# Patient Record
Sex: FEMALE | Race: WHITE | NOT HISPANIC OR LATINO | Employment: FULL TIME | URBAN - METROPOLITAN AREA
[De-identification: names, ages, dates, MRNs, and addresses within clinical notes are randomized per-mention and may not be internally consistent; named-entity substitution may affect disease eponyms.]

---

## 2017-06-05 ENCOUNTER — TELEPHONE (OUTPATIENT)
Dept: OBSTETRICS AND GYNECOLOGY | Facility: CLINIC | Age: 22
End: 2017-06-05

## 2017-06-05 DIAGNOSIS — R10.2 PELVIC PAIN IN FEMALE: Primary | ICD-10-CM

## 2017-06-05 NOTE — TELEPHONE ENCOUNTER
Pt states she has left sided pelvic pain.  C/o cramping for 3 weeks.  Has a history of an ovarian torsion.  She is requesting an appt.  Scheduled with US and Angelina tomorrow at 1500. Aware of US prep.  She did not want to wait until Thursday to see Dr. Plata.

## 2017-06-06 ENCOUNTER — OFFICE VISIT (OUTPATIENT)
Dept: OBSTETRICS AND GYNECOLOGY | Facility: CLINIC | Age: 22
End: 2017-06-06
Payer: COMMERCIAL

## 2017-06-06 VITALS
WEIGHT: 150.13 LBS | DIASTOLIC BLOOD PRESSURE: 74 MMHG | SYSTOLIC BLOOD PRESSURE: 106 MMHG | BODY MASS INDEX: 27.63 KG/M2 | HEIGHT: 62 IN

## 2017-06-06 DIAGNOSIS — R10.32 LLQ ABDOMINAL PAIN: Primary | ICD-10-CM

## 2017-06-06 LAB
CANDIDA RRNA VAG QL PROBE: NEGATIVE
G VAGINALIS RRNA GENITAL QL PROBE: POSITIVE
T VAGINALIS RRNA GENITAL QL PROBE: NEGATIVE

## 2017-06-06 PROCEDURE — 99999 PR PBB SHADOW E&M-EST. PATIENT-LVL III: CPT | Mod: PBBFAC,,, | Performed by: NURSE PRACTITIONER

## 2017-06-06 PROCEDURE — 87480 CANDIDA DNA DIR PROBE: CPT

## 2017-06-06 PROCEDURE — 99213 OFFICE O/P EST LOW 20 MIN: CPT | Mod: S$GLB,,, | Performed by: NURSE PRACTITIONER

## 2017-06-06 NOTE — PROGRESS NOTES
Chief Complaint: Pelvic Pain     HPI:      (Swing pt)    Makenna Henriquez is a 21 y.o.  here for evaluation of pelvic pain. The pain is described as aching and sharp, and is 6-8/10 in intensity when it was most intense. Pain is located in the LLQ area without radiation. Onset was sudden occurring 1 month ago. Symptoms have been gradually improving since. Aggravating factors: none. Alleviating factors: none. Associated symptoms: none. The patient denies chills, constipation, diarrhea, dysuria, fever, flatus, frequency, nausea and vomiting. Risk factors for pelvic/abdominal pain include prior pelvic surgery and previous right ovarian torsion x 2; right ovarian teratoma surgically removed 2016. Patient denies change in appetite, nausea/vomiting and diarrhea history of abuse. Patient's last menstrual period was 2017 (exact date).  States she has not been sexually active since 2017.  Also states she has not taken her OCP's for over a year, as she just never went and had them filled.  States menstrual cycles are normal/monthly.    Past Medical History:   Diagnosis Date    Anxiety     Femoral hernia of left side     Insomnia     Ovarian torsion      Past Surgical History:   Procedure Laterality Date    ADENOIDECTOMY      PELVIC LAPAROSCOPY  2016    Right Ovarian Teratoma  ([facility])    WISDOM TOOTH EXTRACTION       Social History     Social History    Marital status: Single     Spouse name: N/A    Number of children: N/A    Years of education: N/A     Social History Main Topics    Smoking status: Never Smoker    Smokeless tobacco: Never Used    Alcohol use Yes      Comment: Socially    Drug use: No    Sexual activity: Yes     Partners: Male     Birth control/ protection: Condom, Coitus interruptus      Comment: Single     Other Topics Concern    None     Social History Narrative    None       ROS:     GENERAL: Denies weight gain or weight loss. Feeling well overall.   SKIN: Denies  "rash or lesions.   ABDOMEN: Reports LLQ abdominal pain; denies constipation, diarrhea, nausea, vomiting, change in appetite or rectal bleeding.   URINARY: Denies frequency, dysuria, hematuria.  PELVIC:  Denies vaginal discharge, irritation, or odor.  GYN: See HPI.    Physical Exam:      PHYSICAL EXAM:   Vitals:    06/06/17 1529   BP: 106/74   Weight: 68.1 kg (150 lb 2.1 oz)   Height: 5' 2" (1.575 m)   PainSc: 0-No pain     Body mass index is 27.46 kg/m².    General: No acute distress, alert and engaged  Abdomen: Soft, non-tender, non-distended, suprapubic tenderness absent.  No rebound tenderness or guarding noted.  NO ACUTE abdomen noted.  Pelvis: External genitalia and urethra within normal limits. Vagina without lesions, with dark red discharge (c/w menses that started yesterday), without erythema, without ulcers.  Cervix without cervical motion tenderness, non-friable. Uterus normal in size and nontender. No adnexal masses or tenderness palpated.    Assessment/Plan:     LLQ abdominal pain  -     Vaginosis Screen by DNA Probe        Will call patient with vaginal swab results.    Counseling:     * Acute abdominal pain prec given.  Reassurance given that no structural abnormalities noted upon today's u/s.  May take Ibuprofen prn.  Continue to monitor pain and associated symptoms.  "

## 2017-06-07 ENCOUNTER — TELEPHONE (OUTPATIENT)
Dept: OBSTETRICS AND GYNECOLOGY | Facility: CLINIC | Age: 22
End: 2017-06-07

## 2017-06-07 RX ORDER — TINIDAZOLE 500 MG/1
TABLET ORAL
Qty: 8 TABLET | Refills: 0 | Status: SHIPPED | OUTPATIENT
Start: 2017-06-07 | End: 2017-07-24

## 2017-06-07 NOTE — TELEPHONE ENCOUNTER
----- Message from Angelina Cook NP sent at 6/7/2017  1:19 PM CDT -----  Patient's AFFIRM swab was (+) for BV.    Please call her and let her know Rx sent for (Tinidazole x 2 days).  Remind patient she cannot drink while taking medication or for 3 days after completion of medication.      # Instruct patient to contact pharmacy to check on status of RX)

## 2017-06-07 NOTE — TELEPHONE ENCOUNTER
Spoke with pt and informed her per Angelina Cook NP; affirm swab positive for BV. Pt was informed that (Tinidazole x 2 days)  was sent to her pharmacy for treatment and was instructed on how to take medication. Pt expressed verbal understanding. KAILA

## 2017-06-07 NOTE — PROGRESS NOTES
Patient's AFFIRM swab was (+) for BV.    Please call her and let her know Rx sent for (Tinidazole x 2 days).  Remind patient she cannot drink while taking medication or for 3 days after completion of medication.    * Instruct patient to contact pharmacy to check on status of RX)

## 2017-07-24 ENCOUNTER — OFFICE VISIT (OUTPATIENT)
Dept: INTERNAL MEDICINE | Facility: CLINIC | Age: 22
End: 2017-07-24
Payer: COMMERCIAL

## 2017-07-24 VITALS
DIASTOLIC BLOOD PRESSURE: 78 MMHG | HEIGHT: 62 IN | BODY MASS INDEX: 27.1 KG/M2 | SYSTOLIC BLOOD PRESSURE: 120 MMHG | HEART RATE: 67 BPM | OXYGEN SATURATION: 96 % | WEIGHT: 147.25 LBS | TEMPERATURE: 98 F

## 2017-07-24 DIAGNOSIS — J06.9 UPPER RESPIRATORY TRACT INFECTION, UNSPECIFIED TYPE: Primary | ICD-10-CM

## 2017-07-24 PROCEDURE — 99999 PR PBB SHADOW E&M-EST. PATIENT-LVL III: CPT | Mod: PBBFAC,,, | Performed by: FAMILY MEDICINE

## 2017-07-24 PROCEDURE — 99213 OFFICE O/P EST LOW 20 MIN: CPT | Mod: S$GLB,,, | Performed by: FAMILY MEDICINE

## 2017-07-24 RX ORDER — BENZONATATE 100 MG/1
100 CAPSULE ORAL 3 TIMES DAILY PRN
Qty: 30 CAPSULE | Refills: 0 | Status: SHIPPED | OUTPATIENT
Start: 2017-07-24 | End: 2017-08-03

## 2017-07-24 RX ORDER — CODEINE PHOSPHATE AND GUAIFENESIN 10; 100 MG/5ML; MG/5ML
5 SOLUTION ORAL NIGHTLY PRN
Qty: 118 ML | Refills: 0 | Status: SHIPPED | OUTPATIENT
Start: 2017-07-24 | End: 2017-08-03

## 2017-07-24 RX ORDER — OXYMETAZOLINE HCL 0.05 %
2 SPRAY, NON-AEROSOL (ML) NASAL 2 TIMES DAILY
Refills: 0 | COMMUNITY
Start: 2017-07-24 | End: 2017-07-27

## 2017-07-24 RX ORDER — ALBUTEROL SULFATE 90 UG/1
2 AEROSOL, METERED RESPIRATORY (INHALATION) EVERY 6 HOURS PRN
Qty: 18 G | Refills: 0 | Status: SHIPPED | OUTPATIENT
Start: 2017-07-24 | End: 2023-10-03

## 2017-07-24 NOTE — PROGRESS NOTES
"Subjective:      Patient ID: Makenna Henriquez is a 22 y.o. female.    Chief Complaint: Sinusitis (x 4-5 days) and Cough    4-5 days noticed post nasal drip, fatigue, elevated temp 100.3 and last occurred about 3 days ago. She now remains congested, throat irritation, dry cough and worse with laying down. She has taking emergen-c for it and tylenol which is helping. She does have friends with a cold she has been exposed to.       Review of Systems   Constitutional: Negative.    HENT: Positive for congestion.    Eyes: Negative.    Respiratory: Positive for cough.    Cardiovascular: Negative for chest pain, palpitations and leg swelling.   Gastrointestinal: Negative.      I personally reviewed Past Medical History, Past Surgical history,  Past Social History and Family History    Objective:   /78 (BP Location: Right arm, Patient Position: Sitting, BP Method: Manual)   Pulse 67   Temp 97.8 °F (36.6 °C) (Oral)   Ht 5' 2" (1.575 m)   Wt 66.8 kg (147 lb 4.3 oz)   SpO2 96%   BMI 26.94 kg/m²     Physical Exam   Constitutional: She is oriented to person, place, and time. She appears well-developed and well-nourished. No distress.   HENT:   Head: Normocephalic and atraumatic.   Right Ear: Hearing, tympanic membrane, external ear and ear canal normal.   Left Ear: Hearing, tympanic membrane, external ear and ear canal normal.   Nose: Mucosal edema present. Right sinus exhibits no maxillary sinus tenderness and no frontal sinus tenderness. Left sinus exhibits no maxillary sinus tenderness and no frontal sinus tenderness.   Mouth/Throat: Uvula is midline and oropharynx is clear and moist.   Eyes: Conjunctivae and EOM are normal. Pupils are equal, round, and reactive to light. Right eye exhibits no discharge. Left eye exhibits no discharge. No scleral icterus.   Neck: Normal range of motion. No tracheal deviation present. No thyromegaly present.   Cardiovascular: Normal rate, regular rhythm, normal heart sounds and intact " distal pulses.  Exam reveals no gallop.    No murmur heard.  Pulmonary/Chest: Effort normal and breath sounds normal. No respiratory distress. She has no wheezes. She has no rales. She exhibits no tenderness.   Abdominal: Soft. Bowel sounds are normal. She exhibits no distension and no mass. There is no tenderness. There is no rebound and no guarding.   Musculoskeletal: Normal range of motion.   Lymphadenopathy:        Head (right side): Submandibular adenopathy present.        Head (left side): Submandibular adenopathy present.   Neurological: She is alert and oriented to person, place, and time.   Skin: Skin is warm and dry.   Psychiatric: She has a normal mood and affect. Her behavior is normal. Judgment and thought content normal.   Vitals reviewed.      Makenna was seen today for sinusitis and cough.    Diagnoses and all orders for this visit:    Upper respiratory tract infection, unspecified type  -patient to call if no improvement in 3 days consider abx  -will treat supportively     Other orders  -     benzonatate (TESSALON) 100 MG capsule; Take 1 capsule (100 mg total) by mouth 3 (three) times daily as needed for Cough.  -     albuterol 90 mcg/actuation inhaler; Inhale 2 puffs into the lungs every 6 (six) hours as needed for Wheezing or Shortness of Breath. Rescue  -     guaifenesin-codeine 100-10 mg/5 ml (TUSSI-ORGANIDIN NR)  mg/5 mL syrup; Take 5 mLs by mouth nightly as needed for Cough or Congestion.  -     oxymetazoline (AFRIN SINUS, OXYMETAZOLINE,) 0.05 % nasal spray; 2 sprays by Nasal route 2 (two) times daily.

## 2017-07-25 ENCOUNTER — NURSE TRIAGE (OUTPATIENT)
Dept: ADMINISTRATIVE | Facility: CLINIC | Age: 22
End: 2017-07-25

## 2017-07-26 NOTE — TELEPHONE ENCOUNTER
"  Reason for Disposition   MILD or MODERATE vomiting (e.g., 1 - 5 times / day) (all triage questions negative)    Answer Assessment - Initial Assessment Questions  1. VOMITING SEVERITY: "How many times have you vomited in the past 24 hours?"      - MILD:  1 - 2 times/day     - MODERATE: 3 - 5 times/day, decreased oral intake without significant weight loss or symptoms of dehydration     - SEVERE: 6 or more times/day, vomits everything or nearly everything, with significant weight loss, symptoms of dehydration       x1  2. ONSET: "When did the vomiting begin?"       20 min prior to call  3. FLUIDS: "What fluids or food have you vomited up today?" "Have you been able to keep any fluids down?"      Drinking well all day  4. ABDOMINAL PAIN: "Are your having any abdominal pain?" If yes : "How bad is it and what does it feel like?" (e.g., crampy, dull, intermittent, constant)       denies  5. DIARRHEA: "Is there any diarrhea?" If so, ask: "How many times today?"       denies  6. CONTACTS: "Is there anyone else in the family with the same symptoms?"       denies  7. CAUSE: "What do you think is causing your vomiting?"      Not sure if r/t sinus infection  8. HYDRATION STATUS: "Any signs of dehydration?" (e.g., dry mouth [not only dry lips], too weak to stand) "When did you last urinate?"      denies  9. OTHER SYMPTOMS: "Do you have any other symptoms?" (e.g., fever, headache, vertigo, vomiting blood or coffee grounds)      Intermittent HA, denies rest  10. PREGNANCY: "Is there any chance you are pregnant?" "When was your last menstrual period?"        n/a    Protocols used: ST VOMITING-A-  pt states she was seen yesterday and dx w/ URI/ has a lot of sinus drainage down back of throat/ has been drinking well, urinating well/ this PM felt nauseated and vomited x 1/ denies fever/ denies abdominal pain/emesis was the soup and toast she had eaten    Interim care--call back for any changes or concerns  F/u with pcp    Keila " Annia RUTH

## 2017-07-27 ENCOUNTER — TELEPHONE (OUTPATIENT)
Dept: INTERNAL MEDICINE | Facility: CLINIC | Age: 22
End: 2017-07-27

## 2017-07-27 RX ORDER — AZITHROMYCIN 250 MG/1
TABLET, FILM COATED ORAL
Qty: 6 TABLET | Refills: 0 | Status: SHIPPED | OUTPATIENT
Start: 2017-07-27 | End: 2017-08-01

## 2017-07-27 NOTE — LETTER
July 31, 2017    Makenna Henriquez  [address] [address]             [facility]  [address] [place] [address]  Phone: [phone]  Fax: [phone] Dear Ms. Henriquez:    Makenna,    Good afternoon. We have been unable to reach you by phone or leave any voicemails. On 7/27/17 Dr. Hernandez sent in Azithromycin to your pharmacy to treat your respiratory infection. Please let us know when you receive this message and let us know if this medication worked for you or if you are still experiencing any symptoms.     Thanks,  NATALIE Raygoza         If you have any questions or concerns, please don't hesitate to call.    Sincerely,        Idalmis Loera LPN

## 2017-07-27 NOTE — TELEPHONE ENCOUNTER
----- Message from Leonora Sanon sent at 7/27/2017 11:52 AM CDT -----  Contact: self, [phone]  Patient states she was on 7/24 for a respiratory infection and was told to call back and request antibiotics if she did not feel better. Please advise.

## 2017-07-31 NOTE — TELEPHONE ENCOUNTER
pts phone went straight to voicemail. Voicemail was not set up. No other number provided for pt. Message sent through [employer]. Letter also created and sent to pt.

## 2017-12-14 ENCOUNTER — OFFICE VISIT (OUTPATIENT)
Dept: INTERNAL MEDICINE | Facility: CLINIC | Age: 22
End: 2017-12-14
Payer: COMMERCIAL

## 2017-12-14 VITALS
HEIGHT: 62 IN | HEART RATE: 73 BPM | OXYGEN SATURATION: 98 % | TEMPERATURE: 99 F | BODY MASS INDEX: 26.78 KG/M2 | WEIGHT: 145.5 LBS | DIASTOLIC BLOOD PRESSURE: 74 MMHG | SYSTOLIC BLOOD PRESSURE: 120 MMHG

## 2017-12-14 DIAGNOSIS — R10.9 ABDOMINAL PAIN, UNSPECIFIED ABDOMINAL LOCATION: Primary | ICD-10-CM

## 2017-12-14 LAB — B-HCG UR QL: NEGATIVE

## 2017-12-14 PROCEDURE — 99212 OFFICE O/P EST SF 10 MIN: CPT | Mod: S$GLB,,, | Performed by: INTERNAL MEDICINE

## 2017-12-14 PROCEDURE — 99999 PR PBB SHADOW E&M-EST. PATIENT-LVL III: CPT | Mod: PBBFAC,,, | Performed by: INTERNAL MEDICINE

## 2017-12-14 PROCEDURE — 81025 URINE PREGNANCY TEST: CPT

## 2017-12-14 RX ORDER — DICYCLOMINE HYDROCHLORIDE 10 MG/1
10 CAPSULE ORAL
Qty: 16 CAPSULE | Refills: 0 | Status: SHIPPED | OUTPATIENT
Start: 2017-12-14 | End: 2017-12-18

## 2017-12-14 NOTE — PATIENT INSTRUCTIONS
Dicyclomine (bentyl) tablets or capsules  What is this medicine?  DICYCLOMINE (dye AYAHE shawna phillips) is used to treat bowel problems including irritable bowel syndrome.  How should I use this medicine?  Take this medicine by mouth with a glass of water. Follow the directions on the prescription label. It is best to take this medicine on an empty stomach, 30 minutes to 1 hour before meals. Take your medicine at regular intervals. Do not take your medicine more often than directed.  Talk to your pediatrician regarding the use of this medicine in children. Special care may be needed. While this drug may be prescribed for children as young as 6 months of age for selected conditions, precautions do apply.    What should I watch for while using this medicine?  You may get drowsy, dizzy, or have blurred vision. Do not drive, use machinery, or do anything that needs mental alertness until you know how this medicine affects you. To reduce the risk of dizzy or fainting spells, do not sit or stand up quickly, especially if you are an older patient. Alcohol can make you more drowsy, avoid alcoholic drinks.  Stay out of bright light and wear sunglasses if this medicine makes your eyes more sensitive to light.  Avoid extreme heat (hot tubs, saunas). This medicine can cause you to sweat less than normal. Your body temperature could increase to dangerous levels, which may lead to heat stroke.  Antacids can stop this medicine from working. If you get an upset stomach and want to take an antacid, make sure there is an interval of at least 1 to 2 hours before or after you take this medicine.  Your mouth may get dry. Chewing sugarless gum or sucking hard candy, and drinking plenty of water may help. Contact your doctor if the problem does not go away or is severe.  NOTE:This sheet is a summary. It may not cover all possible information. If you have questions about this medicine, talk to your doctor, pharmacist, or health care  provider. Copyright© 2017 [employer]        Diet for Vomiting or Diarrhea (Adult)    Your symptoms may return or get worse after eating certain foods listed below. If this happens, stop eating these foods until your symptoms ease and you feel better.  Once the vomiting stops, follow the steps below.   During the first 12 to 24 hours  During the first 12 to 24 hours, follow this diet:  · Drinks. Plain water, sport drinks like electrolyte solutions, soft drinks without caffeine, mineral water (plain or flavored), clear fruit juices, and decaffeinated tea and coffee.  · Soups. Clear broth.  · Desserts. Plain gelatin, popsicles, and fruit juice bars. As you feel better, you may add 6 to 8 ounces of yogurt per day. If you have diarrhea, don't have foods or drinks that contain sugar, high-fructose corn syrup, or sugar alcohols.  During the next 24 hours  During the next 24 hours you may add the following to the above:  · Hot cereal, plain toast, bread, rolls, and crackers  · Plain noodles, rice, mashed potatoes, and chicken noodle or rice soup  · Unsweetened canned fruit (but not pineapple) and bananas  Don't eat more than 15 grams of fat a day. Do this by staying away from margarine, butter, oils, mayonnaise, sauces, gravies, fried foods, peanut butter, meat, poultry, and fish.  Don't eat much fiber. Stay away from raw or cooked vegetables, fresh fruits (except bananas), and bran cereals.  Limit how much caffeine and chocolate you have. Do not use any spices or seasonings except salt.  During the next 24 hours  Slowly go back to your normal diet, as you feel better and your symptoms ease.  Date Last Reviewed: 8/1/2016  © 6328-4155 [employer]. [address] [place] [address], [place] [address]. All rights reserved. This information is not intended as a substitute for professional medical care. Always follow your healthcare professional's instructions.        Viral Gastroenteritis (Adult)    Gastroenteritis is  commonly called the stomach flu. It is most often caused by a virus that affects the stomach and intestinal tract and usually lasts from 2 to 7 days. Common viruses causing gastroenteritis include norovirus, rotavirus, and hepatitis A. Non-viral causes of gastroenteritis include bacteria, parasites, and toxins.  The danger from repeated vomiting or diarrhea is dehydration. This is the loss of too much fluid from the body. When this occurs, body fluids must be replaced. Antibiotics do not help with this illness because it is usually viral.Simple home treatment will be helpful.  Symptoms of viral gastroenteritis may include:  · Watery, loose stools  · Stomach pain or abdominal cramps  · Fever and chills  · Nausea and vomiting  · Loss of bowel control  · Headache  Home care  Gastroenteritis is transmitted by contact with the stool or vomit of an infected person. This can occur from person to person or from contact with a contaminated surface.  Follow these guidelines when caring for yourself at home:  · If symptoms are severe, rest at home for the next 24 hours or until you are feeling better.  · Wash your hands with soap and water or use alcohol-based  to prevent the spread of infection. Wash your hands after touching anyone who is sick.  · Wash your hands or use alcohol-based  after using the toilet and before meals. Clean the toilet after each use.  Remember these tips when preparing food:  · People with diarrhea should not prepare or serve food to others. When preparing foods, wash your hands before and after.  · Wash your hands after using cutting boards, countertops, knives, or utensils that have been in contact with raw food.  · Keep uncooked meats away from cooked and ready-to-eat foods.  Medicine  You may use acetaminophen or NSAID medicines like ibuprofen or naproxen to control fever unless another medicine was given. If you have chronic liver or kidney disease, talk with your healthcare  provider before using these medicines. Also talk with your provider if you've had a stomach ulcer or gastrointestinal bleeding. Don't give aspirin to anyone under 18 years of age who is ill with a fever. It may cause severe liver damage. Don't use NSAIDS is you are already taking one for another condition (like arthritis) or are on aspirin (such as for heart disease or after a stroke).  If medicine for vomiting or diarrhea are prescribed, take these only as directed. Do not take over-the-counter medicines for vomiting or diarrhea unless instructed by your healthcare provider.  Diet  Follow these guidelines for food:  · Water and liquids are important so you don't get dehydrated. Drink a small amount at a time or suck on ice chips if you are vomiting.  · If you eat, avoid fatty, greasy, spicy, or fried foods.  · Don't eat dairy if you have diarrhea. This can make diarrhea worse.  · Avoid tobacco, alcohol, and caffeine which may worsen symptoms.  During the first 24 hours (the first full day), follow the diet below:  · Beverages. Sports drinks, soft drinks without caffeine, ginger ale, mineral water (plain or flavored), decaffeinated tea and coffee. If you are very dehydrated, sports drinks aren't a good choice. They have too much sugar and not enough electrolytes. In this case, commercially available products called oral rehydration solutions, are best.  · Soups. Eat clear broth, consommé, and bouillon.  · Desserts. Eat gelatin, popsicles, and fruit juice bars.  During the next 24 hours (the second day), you may add the following to the above:  · Hot cereal, plain toast, bread, rolls, and crackers  · Plain noodles, rice, mashed potatoes, chicken noodle or rice soup  · Unsweetened canned fruit (avoid pineapple), bananas  · Limit fat intake to less than 15 grams per day. Do this by avoiding margarine, butter, oils, mayonnaise, sauces, gravies, fried foods, peanut butter, meat, poultry, and fish.  · Limit fiber and  avoid raw or cooked vegetables, fresh fruits (except bananas), and bran cereals.  · Limit caffeine and chocolate. Don't use spices or seasonings other than salt.  · Limit dairy products.  · Avoid alcohol.  During the next 24 hours:  · Gradually resume a normal diet as you feel better and your symptoms improve.  · If at any time it starts getting worse again, go back to clear liquids until you feel better.  Follow-up care  Follow up with your healthcare provider, or as advised. Call your provider if you don't get better within 24 hours or if diarrhea lasts more than a week. Also follow up if you are unable to keep down liquids and get dehydrated. If a stool (diarrhea) sample was taken, call as directed for the results.  Call 911  Call 911 if any of these occur:  · Trouble breathing  · Chest pain  · Confused  · Severe drowsiness or trouble awakening  · Fainting or loss of consciousness  · Rapid heart rate  · Seizure  · Stiff neck  When to seek medical advice  Call your healthcare provider right away if any of these occur:  · Abdominal pain that gets worse  · Continued vomiting (unable to keep liquids down)  · Frequent diarrhea (more than 5 times a day)  · Blood in vomit or stool (black or red color)  · Dark urine, reduced urine output, or extreme thirst  · Weakness or dizziness  · Drowsiness  · Fever of 100.4°F (38°C) oral or higher that does not get better with fever medicine  · New rash  Date Last Reviewed: 1/3/2016  © 7859-8223 [employer]. [address] [place] [address], [place] [address]. All rights reserved. This information is not intended as a substitute for professional medical care. Always follow your healthcare professional's instructions.

## 2017-12-16 NOTE — PROGRESS NOTES
Portions of this note are generated with voice recognition software. Typographical errors may exist.     SUBJECTIVE:    This is a/an 22 y.o. female here for primary care visit for  Chief Complaint   Patient presents with    Abdominal Pain     left side and radiates    Emesis    Diarrhea     States that symptoms started on Monday with nausea and then progressing to diarrhea.  No constitutional symptoms.  No significant abdominal pain just cramping but not of a severe nature.  Not seeming to localize in a specific region.  No known sick contacts.  No known occupational exposure.  Patient does not work with children.  Does not work in food industry.  Patient does not describe any risk factors for pregnancy.  No abnormal vaginal discharge.  Nausea has subsided significantly since the onset of this illness.  Now limited to diarrheal illness.  Frequency of diarrhea is 3-4 times in 24 hours.  Denies orthostasis or other symptoms of dehydration.  Patient has stayed home from work.  Patient has not pursued anti-diarrheal medication or antibiotics.  No recent anti-biotic utilization.      Medications Reviewed and Updated    Past medical, family, and social histories were reviewed and updated.    Review of Systems negative unless otherwise noted in history of present illness-  ROS    General ROS: negative  Psychological ROS: negative  Allergy and Immunology ROS: negative  Cardiovascular ROS: negative  Gastrointestinal ROS: negative  Genito-Urinary ROS: negative  Musculoskeletal ROS: negative          Allergic:    Review of patient's allergies indicates:   Allergen Reactions    Bactrim [sulfamethoxazole-trimethoprim]        OBJECTIVE:  BP: 120/74 Pulse: 73 Temp: 98.6 °F (37 °C)  Wt Readings from Last 3 Encounters:   12/14/17 66 kg (145 lb 8.1 oz)   07/24/17 66.8 kg (147 lb 4.3 oz)   06/06/17 68.1 kg (150 lb 2.1 oz)    Body mass index is 26.61 kg/m².  Previous Blood Pressure Readings :   BP Readings from Last 3 Encounters:    12/14/17 120/74   07/24/17 120/78   06/06/17 106/74       Physical Exam    GEN: No apparent distress  HEENT: sclera non-icteric, conjunctiva clear  CV: no peripheral edema  PULM: breathing non-labored  ABD: non, protuberant abdomen. Supple.  Hyperactive bowel sounds.  PSYCH: appropriate affect  MSK: able to rise from chair without assistance  SKIN: normal skin turgor    Pertinent Labs Reviewed       ASSESSMENT/PLAN:    Abdominal pain, unspecified abdominal location.  Likely viral gastroenteritis.  Symptomatic management. .patient advised if symptoms change or intensify to seek care in the nearest qualified health center   -     Cancel: POCT Urine Pregnancy  -     dicyclomine (BENTYL) 10 MG capsule; Take 1 capsule (10 mg total) by mouth 4 (four) times daily before meals and nightly.  Dispense: 16 capsule; Refill: 0  -     PREGNANCY TEST, URINE RAPID          No future appointments.    Yoan Bonner  12/16/2017  9:41 AM

## 2018-02-07 ENCOUNTER — OFFICE VISIT (OUTPATIENT)
Dept: FAMILY MEDICINE | Facility: CLINIC | Age: 23
End: 2018-02-07
Payer: COMMERCIAL

## 2018-02-07 VITALS
WEIGHT: 144.63 LBS | HEIGHT: 62 IN | OXYGEN SATURATION: 98 % | DIASTOLIC BLOOD PRESSURE: 80 MMHG | SYSTOLIC BLOOD PRESSURE: 120 MMHG | HEART RATE: 72 BPM | BODY MASS INDEX: 26.61 KG/M2 | TEMPERATURE: 99 F

## 2018-02-07 DIAGNOSIS — M54.9 MUSCULOSKELETAL BACK PAIN: Primary | ICD-10-CM

## 2018-02-07 PROCEDURE — 99999 PR PBB SHADOW E&M-EST. PATIENT-LVL III: CPT | Mod: PBBFAC,,, | Performed by: FAMILY MEDICINE

## 2018-02-07 PROCEDURE — 3008F BODY MASS INDEX DOCD: CPT | Mod: S$GLB,,, | Performed by: FAMILY MEDICINE

## 2018-02-07 PROCEDURE — 99214 OFFICE O/P EST MOD 30 MIN: CPT | Mod: S$GLB,,, | Performed by: FAMILY MEDICINE

## 2018-02-07 RX ORDER — CYCLOBENZAPRINE HCL 5 MG
5 TABLET ORAL 3 TIMES DAILY PRN
Qty: 21 TABLET | Refills: 0 | Status: SHIPPED | OUTPATIENT
Start: 2018-02-07 | End: 2018-02-14

## 2018-02-07 RX ORDER — IBUPROFEN 600 MG/1
600 TABLET ORAL 3 TIMES DAILY
Qty: 60 TABLET | Refills: 1 | Status: SHIPPED | OUTPATIENT
Start: 2018-02-07 | End: 2018-07-20

## 2018-02-07 NOTE — PROGRESS NOTES
HPI:  Makenna Henriquez is a 22 y.o. year old female that  presents with complaint of right sided shoulder pain that started yesterday morning. She denies any trauma. She has tried using a heating pad , alieve , and ibuprofen with no relief. She states that it is her right side of her back in the center of her scapula. She has not been doing any motions that have been unusual.  She may have slept on it in a weird position.  Chief Complaint   Patient presents with    Shoulder Pain     right x 2 days   .     HPI      Past Medical History:   Diagnosis Date    Anxiety     Femoral hernia of left side     Insomnia     Ovarian torsion      Social History     Social History    Marital status: Single     Spouse name: N/A    Number of children: N/A    Years of education: N/A     Occupational History    opthalmology clinic scribe       Social History Main Topics    Smoking status: Never Smoker    Smokeless tobacco: Never Used    Alcohol use Yes      Comment: Socially    Drug use: No    Sexual activity: Yes     Partners: Male     Birth control/ protection: Condom, Coitus interruptus      Comment: Single     Other Topics Concern    Not on file     Social History Narrative    No narrative on file     Past Surgical History:   Procedure Laterality Date    ADENOIDECTOMY      PELVIC LAPAROSCOPY  08/04/2016    Right Ovarian Teratoma  ([facility])    WISDOM TOOTH EXTRACTION       Family History   Problem Relation Age of Onset    Diabetes Mother     Hypertension Mother     Diabetes Father     Hypertension Father     Lung cancer Maternal Grandmother     Breast cancer Neg Hx     Colon cancer Neg Hx     Ovarian cancer Neg Hx     Cancer Neg Hx            Review of Systems  General ROS: negative for chills, fever or weight loss  ENT ROS: negative for epistaxis, sore throat or rhinorrhea  Respiratory ROS: no cough, shortness of breath, or wheezing  Cardiovascular ROS: no chest pain or dyspnea on exertion  Extrem: right  "shoulder pain    Physical Exam:  /80 (BP Location: Left arm, Patient Position: Sitting, BP Method: Medium (Manual))   Pulse 72   Temp 99 °F (37.2 °C) (Oral)   Ht 5' 2" (1.575 m)   Wt 65.6 kg (144 lb 10 oz)   LMP 02/05/2018   SpO2 98%   BMI 26.45 kg/m²   General appearance: alert, cooperative, no distress  Constitutional:Oriented to person, place, and time.appears well-developed and well-nourished.  Lungs: clear to auscultation bilaterally, no dullness to percussion bilaterally  Heart: regular rate and rhythm without rub; no displacement of the PMI , S1&S2 present  Extrem: Right shoulder pan to palpation , nl ROM, strength 5/5  Physical Exam    LABS:    Complete Blood Count  Lab Results   Component Value Date    RBC 4.67 09/10/2015    HGB 13.3 09/10/2015    HCT 39.7 09/10/2015    MCV 85 09/10/2015    MCH 28.5 09/10/2015    MCHC 33.5 09/10/2015    RDW 13.0 09/10/2015     09/10/2015    MPV 10.0 09/10/2015    GRAN 6.0 09/10/2015    GRAN 71.7 09/10/2015    LYMPH 1.7 09/10/2015    LYMPH 20.1 09/10/2015    MONO 0.5 09/10/2015    MONO 5.9 09/10/2015    EOS 0.2 09/10/2015    BASO 0.03 09/10/2015    EOSINOPHIL 1.8 09/10/2015    BASOPHIL 0.4 09/10/2015    DIFFMETHOD Automated 09/10/2015       Comprehensive Metabolic Panel  Lab Results   Component Value Date    GLU 91 09/10/2015    BUN 11 09/10/2015    CREATININE 0.8 09/10/2015     09/10/2015    K 4.1 09/10/2015     09/10/2015    PROT 7.1 09/10/2015    ALBUMIN 3.6 09/10/2015    BILITOT 0.2 09/10/2015    AST 16 09/10/2015    ALKPHOS 57 09/10/2015    CO2 22 (L) 09/10/2015    ALT 11 09/10/2015    ANIONGAP 9 09/10/2015    EGFRNONAA >60.0 09/10/2015    ESTGFRAFRICA >60.0 09/10/2015       LIPID  Lab Results   Component Value Date    CHOL 155 09/10/2015    HDL 46 09/10/2015         TSH  Lab Results   Component Value Date    TSH 0.994 09/10/2015       Current Outpatient Prescriptions   Medication Sig Dispense Refill    albuterol 90 mcg/actuation inhaler " Inhale 2 puffs into the lungs every 6 (six) hours as needed for Wheezing or Shortness of Breath. Rescue 18 g 0    b complex vitamins capsule Take 1 capsule by mouth once daily.      cyclobenzaprine (FLEXERIL) 5 MG tablet Take 1 tablet (5 mg total) by mouth 3 (three) times daily as needed for Muscle spasms. 21 tablet 0    ibuprofen (ADVIL,MOTRIN) 600 MG tablet Take 1 tablet (600 mg total) by mouth 3 (three) times daily. 60 tablet 1     No current facility-administered medications for this visit.        Assessment:    ICD-10-CM ICD-9-CM    1. Musculoskeletal back pain M54.9 724.5 cyclobenzaprine (FLEXERIL) 5 MG tablet      ibuprofen (ADVIL,MOTRIN) 600 MG tablet         Plan:    No Follow-up on file.          Ada Banegas MD

## 2018-04-23 ENCOUNTER — TELEPHONE (OUTPATIENT)
Dept: INTERNAL MEDICINE | Facility: CLINIC | Age: 23
End: 2018-04-23

## 2018-04-23 NOTE — TELEPHONE ENCOUNTER
----- Message from Su Fitzpatrick sent at 4/23/2018 12:27 PM CDT -----  No. [phone]    Patient needs labs scheduled.  Her appointment for a physical is 5/2/18.

## 2018-04-24 ENCOUNTER — TELEPHONE (OUTPATIENT)
Dept: INTERNAL MEDICINE | Facility: CLINIC | Age: 23
End: 2018-04-24

## 2018-04-24 DIAGNOSIS — Z00.00 ROUTINE GENERAL MEDICAL EXAMINATION AT A HEALTH CARE FACILITY: Primary | ICD-10-CM

## 2018-04-24 NOTE — TELEPHONE ENCOUNTER
----- Message from Priyanka Alvarado sent at 4/23/2018  5:41 PM CDT -----  Contact: [phone]/self - she is available after 4pm  Patient states she is returning your call. Please advise.

## 2018-05-21 ENCOUNTER — TELEPHONE (OUTPATIENT)
Dept: OBSTETRICS AND GYNECOLOGY | Facility: CLINIC | Age: 23
End: 2018-05-21

## 2018-05-21 DIAGNOSIS — N83.209 CYST OF OVARY, UNSPECIFIED LATERALITY: Primary | ICD-10-CM

## 2018-05-21 NOTE — TELEPHONE ENCOUNTER
Dr. Plata-- pt states that she just got discharged from the ER and states that they wanted her to follow up with her OBGYN asap, because they think a cyst ruptured. Pt states that she has a history of ovarian cyst. Pt would like to be seen tomorrow if possible. Pt's # [phone]

## 2018-05-21 NOTE — TELEPHONE ENCOUNTER
Swing pt- states she went to  ED due to pain on her left side and they did and ultrasound and they told her she had a ruptured cyst and to follow up with her OBGYN. Pt states she has a history of ovarian torsion and would like to f/u. They did not see torsion on her ultrasound at the ED, but instructed her to f/u with her OBGYN in 6 weeks.     Pt scheduled June 28th for US+appt. Aware of US prep. Declined earlier appt stating if her symptoms worsened she would return call.

## 2018-07-12 ENCOUNTER — LAB VISIT (OUTPATIENT)
Dept: LAB | Facility: HOSPITAL | Age: 23
End: 2018-07-12
Attending: INTERNAL MEDICINE
Payer: COMMERCIAL

## 2018-07-12 DIAGNOSIS — Z00.00 ROUTINE GENERAL MEDICAL EXAMINATION AT A HEALTH CARE FACILITY: ICD-10-CM

## 2018-07-12 LAB
ALBUMIN SERPL BCP-MCNC: 3.6 G/DL
ALP SERPL-CCNC: 51 U/L
ALT SERPL W/O P-5'-P-CCNC: 6 U/L
ANION GAP SERPL CALC-SCNC: 7 MMOL/L
AST SERPL-CCNC: 13 U/L
BASOPHILS # BLD AUTO: 0.03 K/UL
BASOPHILS NFR BLD: 0.4 %
BILIRUB SERPL-MCNC: 0.4 MG/DL
BUN SERPL-MCNC: 8 MG/DL
CALCIUM SERPL-MCNC: 8.8 MG/DL
CHLORIDE SERPL-SCNC: 107 MMOL/L
CHOLEST SERPL-MCNC: 151 MG/DL
CHOLEST/HDLC SERPL: 3.1 {RATIO}
CO2 SERPL-SCNC: 26 MMOL/L
CREAT SERPL-MCNC: 0.7 MG/DL
DIFFERENTIAL METHOD: NORMAL
EOSINOPHIL # BLD AUTO: 0.4 K/UL
EOSINOPHIL NFR BLD: 4.8 %
ERYTHROCYTE [DISTWIDTH] IN BLOOD BY AUTOMATED COUNT: 13.2 %
EST. GFR  (AFRICAN AMERICAN): >60 ML/MIN/1.73 M^2
EST. GFR  (NON AFRICAN AMERICAN): >60 ML/MIN/1.73 M^2
GLUCOSE SERPL-MCNC: 100 MG/DL
HCT VFR BLD AUTO: 38.4 %
HDLC SERPL-MCNC: 48 MG/DL
HDLC SERPL: 31.8 %
HGB BLD-MCNC: 12.7 G/DL
IMM GRANULOCYTES # BLD AUTO: 0.03 K/UL
IMM GRANULOCYTES NFR BLD AUTO: 0.4 %
LDLC SERPL CALC-MCNC: 76.4 MG/DL
LYMPHOCYTES # BLD AUTO: 2 K/UL
LYMPHOCYTES NFR BLD: 27.3 %
MCH RBC QN AUTO: 29.2 PG
MCHC RBC AUTO-ENTMCNC: 33.1 G/DL
MCV RBC AUTO: 88 FL
MONOCYTES # BLD AUTO: 0.4 K/UL
MONOCYTES NFR BLD: 5.7 %
NEUTROPHILS # BLD AUTO: 4.5 K/UL
NEUTROPHILS NFR BLD: 61.4 %
NONHDLC SERPL-MCNC: 103 MG/DL
NRBC BLD-RTO: 0 /100 WBC
PLATELET # BLD AUTO: 343 K/UL
PMV BLD AUTO: 10.1 FL
POTASSIUM SERPL-SCNC: 3.8 MMOL/L
PROT SERPL-MCNC: 6.9 G/DL
RBC # BLD AUTO: 4.35 M/UL
SODIUM SERPL-SCNC: 140 MMOL/L
TRIGL SERPL-MCNC: 133 MG/DL
TSH SERPL DL<=0.005 MIU/L-ACNC: 0.65 UIU/ML
WBC # BLD AUTO: 7.35 K/UL

## 2018-07-12 PROCEDURE — 80053 COMPREHEN METABOLIC PANEL: CPT

## 2018-07-12 PROCEDURE — 36415 COLL VENOUS BLD VENIPUNCTURE: CPT | Mod: PO

## 2018-07-12 PROCEDURE — 84443 ASSAY THYROID STIM HORMONE: CPT

## 2018-07-12 PROCEDURE — 85025 COMPLETE CBC W/AUTO DIFF WBC: CPT

## 2018-07-12 PROCEDURE — 80061 LIPID PANEL: CPT

## 2018-07-20 ENCOUNTER — OFFICE VISIT (OUTPATIENT)
Dept: INTERNAL MEDICINE | Facility: CLINIC | Age: 23
End: 2018-07-20
Payer: COMMERCIAL

## 2018-07-20 VITALS
BODY MASS INDEX: 28.16 KG/M2 | WEIGHT: 153 LBS | HEART RATE: 70 BPM | DIASTOLIC BLOOD PRESSURE: 64 MMHG | OXYGEN SATURATION: 95 % | HEIGHT: 62 IN | SYSTOLIC BLOOD PRESSURE: 102 MMHG

## 2018-07-20 DIAGNOSIS — Z00.00 PREVENTATIVE HEALTH CARE: Primary | ICD-10-CM

## 2018-07-20 DIAGNOSIS — Z23 NEED FOR TDAP VACCINATION: ICD-10-CM

## 2018-07-20 DIAGNOSIS — Z23 NEED FOR HPV VACCINATION: ICD-10-CM

## 2018-07-20 DIAGNOSIS — F41.1 GAD (GENERALIZED ANXIETY DISORDER): ICD-10-CM

## 2018-07-20 PROBLEM — D27.0 TERATOMA OF OVARY, RIGHT: Status: ACTIVE | Noted: 2018-07-20

## 2018-07-20 PROBLEM — D27.0 TERATOMA OF OVARY, RIGHT: Status: RESOLVED | Noted: 2018-07-20 | Resolved: 2018-07-20

## 2018-07-20 PROBLEM — N83.519 OVARIAN TORSION: Status: ACTIVE | Noted: 2018-07-20

## 2018-07-20 PROBLEM — N83.519 OVARIAN TORSION: Status: RESOLVED | Noted: 2018-07-20 | Resolved: 2018-07-20

## 2018-07-20 PROCEDURE — 99999 PR PBB SHADOW E&M-EST. PATIENT-LVL III: CPT | Mod: PBBFAC,,, | Performed by: INTERNAL MEDICINE

## 2018-07-20 PROCEDURE — 90472 IMMUNIZATION ADMIN EACH ADD: CPT | Mod: S$GLB,,, | Performed by: INTERNAL MEDICINE

## 2018-07-20 PROCEDURE — 90471 IMMUNIZATION ADMIN: CPT | Mod: S$GLB,,, | Performed by: INTERNAL MEDICINE

## 2018-07-20 PROCEDURE — 90715 TDAP VACCINE 7 YRS/> IM: CPT | Mod: S$GLB,,, | Performed by: INTERNAL MEDICINE

## 2018-07-20 PROCEDURE — 90651 9VHPV VACCINE 2/3 DOSE IM: CPT | Mod: S$GLB,,, | Performed by: INTERNAL MEDICINE

## 2018-07-20 PROCEDURE — 99395 PREV VISIT EST AGE 18-39: CPT | Mod: 25,S$GLB,, | Performed by: INTERNAL MEDICINE

## 2018-07-20 RX ORDER — CITALOPRAM 20 MG/1
20 TABLET, FILM COATED ORAL DAILY
Qty: 30 TABLET | Refills: 5 | Status: SHIPPED | OUTPATIENT
Start: 2018-07-20 | End: 2018-09-06

## 2018-07-20 NOTE — PROGRESS NOTES
Subjective:       Patient ID: Makenna Henriquez is a 22 y.o. female.    Chief Complaint: Annual Exam    HPI 22-year-old female presents to clinic today for annual physical she is having some problems with anxiety it would like to resume a medication for this.  She was previously taking Celexa and like to restart it she states she was on a low-dose before would like to try the higher dose previously she was on 10 mg.  Typically she uses meditation and exercise to help with her anxiety but feels it is more of a consistent issue and would like to resume her medication for anxiety disorder.  Patient reported a little tenderness in the right axillary tail region she states is somewhat a little tender during her cycles and around that time  Review of Systems  otherwise negative  Objective:      Physical Exam  General: Well-appearing, well-nourished.  No distress  HEENT: conjunctivae are normal.  Pupils are equal and reative to light.  TM's are clear and intact bilaterally.  Hearing is grossly normal.  Nasopharynx is clear.  Oropharynx is clear.  Neck: Supple.  No thyroid megaly.  No bruits.  Lymph: No cervical or supraclavicular adenopathy.  Heart: Regular rate and rhythm, without murmur, rub or gallop.  Lungs: Clear to auscultation; respiratory effort normal.  Abdomen: Soft, nontender, nondistended.  Normoactive bowel sounds.  No hepatomegaly.  No masses.  Extremities: Good distal pulses.  No edema.  Psych: Oriented to time person place.  Judgment and insight seem unimpaired.  Mood and affect are appropriate.  Axillary and breast region examined on this affected side no palpable densities patient could not localize at this time nontender  Assessment:       1. Need for Tdap vaccination    2. Need for HPV vaccination    3. Preventative health care    4. MIKEY (generalized anxiety disorder)      five.  Some maxillary tenderness possible small reactive lymph node versus glandular etiology recommend follow-up if increased in size  or concerns or more problematic.  Given that it comes and goes less likely to be suspicious or ominous  Plan:       Makenna was seen today for annual exam.    Diagnoses and all orders for this visit:    Need for Tdap vaccination  -     (In Office Administered) Tdap Vaccine    Need for HPV vaccination  -     (In Office Administered) HPV Vaccine (9-Valent) (3 Dose) (IM)    Preventative health care  Updated performed reviewed today  MIKEY (generalized anxiety disorder)  -     citalopram (CELEXA) 20 MG tablet; Take 1 tablet (20 mg total) by mouth once daily.

## 2018-07-24 ENCOUNTER — PATIENT MESSAGE (OUTPATIENT)
Dept: INTERNAL MEDICINE | Facility: CLINIC | Age: 23
End: 2018-07-24

## 2018-07-27 ENCOUNTER — PATIENT MESSAGE (OUTPATIENT)
Dept: FAMILY MEDICINE | Facility: CLINIC | Age: 23
End: 2018-07-27

## 2018-08-24 ENCOUNTER — TELEPHONE (OUTPATIENT)
Dept: INTERNAL MEDICINE | Facility: CLINIC | Age: 23
End: 2018-08-24

## 2018-08-24 NOTE — TELEPHONE ENCOUNTER
----- Message from Latisha Singh sent at 8/24/2018 12:20 PM CDT -----  Contact: self / [phone]  Patient is requesting a call back . Please advise

## 2018-08-24 NOTE — TELEPHONE ENCOUNTER
Pt states she has taken 2 pregnacy test and it is positive and would like to know if she should continue taking celexa

## 2018-08-27 NOTE — TELEPHONE ENCOUNTER
Please have patient come in this morning so we can discuss issue and get her into OB soon.  Does she know who she wants to see or which hospital she wants to deliver at?

## 2018-09-05 ENCOUNTER — OFFICE VISIT (OUTPATIENT)
Dept: INTERNAL MEDICINE | Facility: CLINIC | Age: 23
End: 2018-09-05
Payer: COMMERCIAL

## 2018-09-05 ENCOUNTER — TELEPHONE (OUTPATIENT)
Dept: OBSTETRICS AND GYNECOLOGY | Facility: CLINIC | Age: 23
End: 2018-09-05

## 2018-09-05 ENCOUNTER — LAB VISIT (OUTPATIENT)
Dept: LAB | Facility: HOSPITAL | Age: 23
End: 2018-09-05
Attending: INTERNAL MEDICINE
Payer: COMMERCIAL

## 2018-09-05 VITALS
TEMPERATURE: 98 F | SYSTOLIC BLOOD PRESSURE: 110 MMHG | OXYGEN SATURATION: 97 % | HEIGHT: 62 IN | WEIGHT: 147.94 LBS | HEART RATE: 73 BPM | BODY MASS INDEX: 27.22 KG/M2 | DIASTOLIC BLOOD PRESSURE: 70 MMHG

## 2018-09-05 DIAGNOSIS — R11.2 NAUSEA AND VOMITING, INTRACTABILITY OF VOMITING NOT SPECIFIED, UNSPECIFIED VOMITING TYPE: ICD-10-CM

## 2018-09-05 DIAGNOSIS — O21.0 HYPEREMESIS GRAVIDARUM: ICD-10-CM

## 2018-09-05 DIAGNOSIS — R19.7 DIARRHEA, UNSPECIFIED TYPE: ICD-10-CM

## 2018-09-05 DIAGNOSIS — O21.0 HYPEREMESIS GRAVIDARUM: Primary | ICD-10-CM

## 2018-09-05 LAB
ALBUMIN SERPL BCP-MCNC: 3.6 G/DL
ALP SERPL-CCNC: 43 U/L
ALT SERPL W/O P-5'-P-CCNC: 9 U/L
ANION GAP SERPL CALC-SCNC: 10 MMOL/L
AST SERPL-CCNC: 13 U/L
BILIRUB SERPL-MCNC: 0.4 MG/DL
BUN SERPL-MCNC: 7 MG/DL
CALCIUM SERPL-MCNC: 9.1 MG/DL
CHLORIDE SERPL-SCNC: 104 MMOL/L
CO2 SERPL-SCNC: 22 MMOL/L
CREAT SERPL-MCNC: 0.6 MG/DL
EST. GFR  (AFRICAN AMERICAN): >60 ML/MIN/1.73 M^2
EST. GFR  (NON AFRICAN AMERICAN): >60 ML/MIN/1.73 M^2
GLUCOSE SERPL-MCNC: 80 MG/DL
HCG INTACT+B SERPL-ACNC: NORMAL MIU/ML
POTASSIUM SERPL-SCNC: 3.7 MMOL/L
PROT SERPL-MCNC: 7.2 G/DL
SODIUM SERPL-SCNC: 136 MMOL/L

## 2018-09-05 PROCEDURE — 99999 PR PBB SHADOW E&M-EST. PATIENT-LVL III: CPT | Mod: PBBFAC,,, | Performed by: INTERNAL MEDICINE

## 2018-09-05 PROCEDURE — 80053 COMPREHEN METABOLIC PANEL: CPT

## 2018-09-05 PROCEDURE — 3008F BODY MASS INDEX DOCD: CPT | Mod: CPTII,S$GLB,, | Performed by: INTERNAL MEDICINE

## 2018-09-05 PROCEDURE — 99214 OFFICE O/P EST MOD 30 MIN: CPT | Mod: S$GLB,,, | Performed by: INTERNAL MEDICINE

## 2018-09-05 PROCEDURE — 84702 CHORIONIC GONADOTROPIN TEST: CPT

## 2018-09-05 PROCEDURE — 36415 COLL VENOUS BLD VENIPUNCTURE: CPT | Mod: PO

## 2018-09-05 RX ORDER — DOXYLAMINE SUCCINATE AND PYRIDOXINE HYDROCHLORIDE, DELAYED RELEASE TABLETS 10 MG/10 MG 10; 10 MG/1; MG/1
2 TABLET, DELAYED RELEASE ORAL NIGHTLY
Qty: 120 TABLET | Refills: 1 | Status: SHIPPED | OUTPATIENT
Start: 2018-09-05 | End: 2018-09-13 | Stop reason: ALTCHOICE

## 2018-09-05 NOTE — TELEPHONE ENCOUNTER
Unconfirmed OB pt c/o nausea for the past month and diarrhea for the past 3 days.  She has intermittent vomiting.  Recommended Imodium if diarrhea is uncontrollable and increase PO hydration.  She has an appt with PCP today.      Diclegis pended

## 2018-09-05 NOTE — TELEPHONE ENCOUNTER
Swing pt, lmp first week of July. Pt has been having vomiting and has had diarrhea for three days. Pt has pregnancy confirmation appt scheduled for tomorrow but wants to come today.

## 2018-09-05 NOTE — PROGRESS NOTES
Subjective:       Patient ID: Makenna Henriquez is a 23 y.o. female.    Chief Complaint: Diarrhea (x3 days); Nausea (x 1 month); and Emesis (x 1 month)    HPI23-year-old female presents to clinic today she is approximately 10 weeks pregnant and about to see OBGYN for her 1st initial visit tomorrow.  She has been suffering with nausea associated with vomiting for at least the last 3-4 weeks.  Vomiting occurs at least twice a day she has no appetite she really has not had anything solid for the last 2 weeks eating a very bland diet and toast.  She contacted the Ob today and they sent in prescription for her diclegis.   She is going to pick it up after this visit.  She also reports having diarrhea for the last 3 days loose and watery.  Boyfriend had similar illness.  She tries to keep up with hydration drinking small amounts at a time.  She does report some abdominal cramping with the diarrhea that is new onset.  No fevers.  Review of Systems    Otherwise negative  Objective:      Physical Exam   General: Well-appearing, well-nourished.  No distress  HEENT: conjunctivae are normal.  Pupils are equal and reative to light.  TM's are clear and intact bilaterally.  Hearing is grossly normal.  Nasopharynx is clear.  Oropharynx is clear.  Neck: Supple.  No thyroid megaly.  No bruits.  Lymph: No cervical or supraclavicular adenopathy.  Heart: Regular rate and rhythm, without murmur, rub or gallop.  Lungs: Clear to auscultation; respiratory effort normal.  Abdomen: Soft, nontender, nondistended.  Normoactive bowel sounds.  No hepatomegaly.  No masses.  Extremities: Good distal pulses.  No edema.  Psych: Oriented to time person place.  Judgment and insight seem unimpaired.  Mood and affect are appropriate.  Assessment:       1. Hyperemesis gravidarum    2. Diarrhea, unspecified type    3. Nausea and vomiting, intractability of vomiting not specified, unspecified vomiting type        Plan:       Makenna was seen today for diarrhea,  nausea and emesis.    Diagnoses and all orders for this visit:    Hyperemesis gravidarum  -     hCG, quantitative; Future  -     Comprehensive metabolic panel; Future    Follow recommendations as prescribed by OB.  Keep appointment for tomorrow.  Recommended hydration with  Small volumes.  Diarrhea, unspecified type   suspect viral advise that this will likely be self-limiting over the next week.  Continue to maintain hydration.  Nausea and vomiting, intractability of vomiting not specified, unspecified vomiting type  -     hCG, quantitative; Future  -     Comprehensive metabolic panel; Future

## 2018-09-06 ENCOUNTER — OFFICE VISIT (OUTPATIENT)
Dept: OBSTETRICS AND GYNECOLOGY | Facility: CLINIC | Age: 23
End: 2018-09-06
Payer: COMMERCIAL

## 2018-09-06 VITALS
DIASTOLIC BLOOD PRESSURE: 72 MMHG | HEIGHT: 62 IN | SYSTOLIC BLOOD PRESSURE: 120 MMHG | BODY MASS INDEX: 26.74 KG/M2 | WEIGHT: 145.31 LBS

## 2018-09-06 DIAGNOSIS — N92.6 MISSED MENSES: Primary | ICD-10-CM

## 2018-09-06 DIAGNOSIS — O21.9 NAUSEA/VOMITING IN PREGNANCY: ICD-10-CM

## 2018-09-06 DIAGNOSIS — Z32.01 POSITIVE URINE PREGNANCY TEST: ICD-10-CM

## 2018-09-06 PROCEDURE — 99999 PR PBB SHADOW E&M-EST. PATIENT-LVL III: CPT | Mod: PBBFAC,,, | Performed by: NURSE PRACTITIONER

## 2018-09-06 PROCEDURE — 99214 OFFICE O/P EST MOD 30 MIN: CPT | Mod: S$GLB,,, | Performed by: NURSE PRACTITIONER

## 2018-09-06 PROCEDURE — 3008F BODY MASS INDEX DOCD: CPT | Mod: CPTII,S$GLB,, | Performed by: NURSE PRACTITIONER

## 2018-09-06 RX ORDER — ONDANSETRON 8 MG/1
8 TABLET, ORALLY DISINTEGRATING ORAL EVERY 8 HOURS PRN
Qty: 12 TABLET | Refills: 1 | Status: SHIPPED | OUTPATIENT
Start: 2018-09-06 | End: 2018-09-13 | Stop reason: SDUPTHER

## 2018-09-06 NOTE — PROGRESS NOTES
"CC: Absence of menses    (Dr. Plata patient)  Patient's last menstrual period was 2018 (exact date).,   EDC: 04/15/2019,   GA:  8w 3d      Makenna Henriquez is a 23 y.o. female  presents with complaint of absence of menstruation.   She reports moderate-severe nausea & vomiting x 4 weeks and is waiting on Bonjesta approval;  denies bleeding, or abdominal pain.  She and boyfriend do live together.  At the end of the Confirmation visit, the pt mentioned that she is unsure of what to do about pregnancy, and has not ruled out termination since this was an unplanned surprise.  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  I recommended that they not make any rash decisions in this matter, and let all of this absorb, giving herself some time to get her n/v under control, as well as meet with  and have U/S in 1 week.  They both verbalized understanding and agreement.    UPT is: positive.     Last Pap:  2016 Normal,  HPV n/a    Past Medical History:   Diagnosis Date    Anxiety     Femoral hernia of left side     Insomnia     Ovarian torsion      Past Surgical History:   Procedure Laterality Date    ADENOIDECTOMY      PELVIC LAPAROSCOPY  2016    Right Ovarian Teratoma  ([facility])    WISDOM TOOTH EXTRACTION       Social History     Tobacco Use    Smoking status: Never Smoker    Smokeless tobacco: Never Used   Substance Use Topics    Alcohol use: Yes     Comment: Socially    Drug use: No     Family History   Problem Relation Age of Onset    Diabetes Mother     Hypertension Mother     Diabetes Father     Hypertension Father     Lung cancer Maternal Grandmother     Breast cancer Neg Hx     Colon cancer Neg Hx     Ovarian cancer Neg Hx     Cancer Neg Hx      OB History    Para Term  AB Living   0 0 0 0 0 0   SAB TAB Ectopic Multiple Live Births   0 0 0 0               /72   Ht 5' 2" (1.575 m)   Wt 65.9 kg (145 lb 4.5 oz)   LMP 2018 " (Exact Date)   BMI 26.57 kg/m²     ROS:  GENERAL: Denies weight gain or weight loss. Feeling well overall.   SKIN: Denies rash or lesions.   HEAD: Denies head injury, headache, or vision changes.   NODES: Denies enlarged lymph nodes.  HEMATOLOGIC: No easy bruisability or excessive bleeding.   MUSCULOSKELETAL: Denies joint pain or swelling.    CARDIOVASCULAR: No chest pain. No shortness of breath. No leg cramps.  NEUROLOGICAL: No headaches. No vision changes.  PSYCHIATRIC: Denies depression, anxiety or mood swings.    VULVOVAGINAL: denies itching, denies abnormal bleeding and denies lesions.  ABDOMEN: denies abdominal pain. nausea and vomiting  Denies nausea. Denies vomiting. No diarrhea. No constipation  URINARY: No incontinence, no nocturia, no frequency and no dysuria.  BREASTS: The patient performs breast self-examination and denies pain, lumps, or nipple discharge.       PE:   APPEARANCE: Well nourished, well developed, in no acute distress.  AFFECT: WNL, alert and oriented x 3.  NECK: Neck symmetric without masses or thyromegaly.   CHEST: Good respiratory effort.     ASSESSMENT and PLAN:  Missed menses  -     ondansetron (ZOFRAN-ODT) 8 MG TbDL; Take 1 tablet (8 mg total) by mouth every 8 (eight) hours as needed (as needed for nausea/vomiting).  Dispense: 12 tablet; Refill: 1    Positive urine pregnancy test  -     US OB/GYN Procedure ([facility]) - Extended List - Future; Future    Nausea/vomiting in pregnancy  -     ondansetron (ZOFRAN-ODT) 8 MG TbDL; Take 1 tablet (8 mg total) by mouth every 8 (eight) hours as needed (as needed for nausea/vomiting).  Dispense: 12 tablet; Refill: 1    *  D/W patient taking Zofran for more emergent breakthrough n/v that is not controlled well enough by Bonjesta as directed, to avoid constipation.  Encouraged her to eat small bites of bland things, such as saltine crackers, and also encouraged her to try and get some Gatorade down if possible to help replenish her electrolytes;  also eating fruits that are high in water content may be helpful if she is unable to keep water down by itself.    *  New OB packet reviewed at length and copy given to patient.  Zika precautions given as well.  Patient was counseled today on proper weight gain based on the [place] [facility]'s recommendations based on her pre-pregnancy weight. Discussed foods to avoid in pregnancy (i.e. sushi, fish that are high in mercury, deli meat, and unpasteurized cheeses). Discussed prenatal vitamin options (i.e. stool softener, DHA). Optional genetic testing discussed.    *  [employer] [facility]-- discussed/education provided with handout. Not discussed at today's visit - pt not feeling well.        Follow-up in about 1 week (around 9/13/2018) for F/U with physician for Initial OB visit & U/S, Labs.    ~25 minutes spent with pt Face to Face with >50% of visit spent on education/counseling.

## 2018-09-13 ENCOUNTER — INITIAL PRENATAL (OUTPATIENT)
Dept: OBSTETRICS AND GYNECOLOGY | Facility: CLINIC | Age: 23
End: 2018-09-13
Payer: COMMERCIAL

## 2018-09-13 ENCOUNTER — PROCEDURE VISIT (OUTPATIENT)
Dept: OBSTETRICS AND GYNECOLOGY | Facility: CLINIC | Age: 23
End: 2018-09-13
Payer: COMMERCIAL

## 2018-09-13 ENCOUNTER — LAB VISIT (OUTPATIENT)
Dept: LAB | Facility: HOSPITAL | Age: 23
End: 2018-09-13
Attending: OBSTETRICS & GYNECOLOGY
Payer: COMMERCIAL

## 2018-09-13 VITALS
WEIGHT: 149.94 LBS | SYSTOLIC BLOOD PRESSURE: 120 MMHG | DIASTOLIC BLOOD PRESSURE: 78 MMHG | BODY MASS INDEX: 27.42 KG/M2

## 2018-09-13 DIAGNOSIS — Z78.9 VEGETARIAN: ICD-10-CM

## 2018-09-13 DIAGNOSIS — Z34.90 PREGNANCY, UNSPECIFIED GESTATIONAL AGE: Primary | ICD-10-CM

## 2018-09-13 DIAGNOSIS — Z36.89 ESTABLISH GESTATIONAL AGE, ULTRASOUND: ICD-10-CM

## 2018-09-13 DIAGNOSIS — N92.6 MISSED MENSES: ICD-10-CM

## 2018-09-13 DIAGNOSIS — Z32.01 POSITIVE URINE PREGNANCY TEST: ICD-10-CM

## 2018-09-13 DIAGNOSIS — O21.9 NAUSEA/VOMITING IN PREGNANCY: ICD-10-CM

## 2018-09-13 DIAGNOSIS — Z34.90 PREGNANCY, UNSPECIFIED GESTATIONAL AGE: ICD-10-CM

## 2018-09-13 DIAGNOSIS — O36.80X0 ENCOUNTER TO DETERMINE FETAL VIABILITY OF PREGNANCY, SINGLE OR UNSPECIFIED FETUS: ICD-10-CM

## 2018-09-13 DIAGNOSIS — Z3A.08 8 WEEKS GESTATION OF PREGNANCY: ICD-10-CM

## 2018-09-13 LAB
ABO + RH BLD: NORMAL
BASOPHILS # BLD AUTO: 0.03 K/UL
BASOPHILS NFR BLD: 0.4 %
BLD GP AB SCN CELLS X3 SERPL QL: NORMAL
DIFFERENTIAL METHOD: NORMAL
EOSINOPHIL # BLD AUTO: 0.3 K/UL
EOSINOPHIL NFR BLD: 3.2 %
ERYTHROCYTE [DISTWIDTH] IN BLOOD BY AUTOMATED COUNT: 12.4 %
GLUCOSE SERPL-MCNC: 82 MG/DL
HCT VFR BLD AUTO: 38.2 %
HGB BLD-MCNC: 13 G/DL
IMM GRANULOCYTES # BLD AUTO: 0.02 K/UL
IMM GRANULOCYTES NFR BLD AUTO: 0.2 %
LYMPHOCYTES # BLD AUTO: 1.9 K/UL
LYMPHOCYTES NFR BLD: 22.5 %
MCH RBC QN AUTO: 29 PG
MCHC RBC AUTO-ENTMCNC: 34 G/DL
MCV RBC AUTO: 85 FL
MONOCYTES # BLD AUTO: 0.5 K/UL
MONOCYTES NFR BLD: 6.2 %
NEUTROPHILS # BLD AUTO: 5.6 K/UL
NEUTROPHILS NFR BLD: 67.5 %
NRBC BLD-RTO: 0 /100 WBC
PLATELET # BLD AUTO: 333 K/UL
PMV BLD AUTO: 10.2 FL
RBC # BLD AUTO: 4.48 M/UL
TSH SERPL DL<=0.005 MIU/L-ACNC: 0.77 UIU/ML
VIT B12 SERPL-MCNC: 287 PG/ML
WBC # BLD AUTO: 8.34 K/UL

## 2018-09-13 PROCEDURE — 86901 BLOOD TYPING SEROLOGIC RH(D): CPT

## 2018-09-13 PROCEDURE — 99999 PR PBB SHADOW E&M-EST. PATIENT-LVL III: CPT | Mod: PBBFAC,,, | Performed by: OBSTETRICS & GYNECOLOGY

## 2018-09-13 PROCEDURE — 87086 URINE CULTURE/COLONY COUNT: CPT

## 2018-09-13 PROCEDURE — 76801 OB US < 14 WKS SINGLE FETUS: CPT | Mod: S$GLB,,, | Performed by: OBSTETRICS & GYNECOLOGY

## 2018-09-13 PROCEDURE — 0502F SUBSEQUENT PRENATAL CARE: CPT | Mod: S$GLB,,, | Performed by: OBSTETRICS & GYNECOLOGY

## 2018-09-13 PROCEDURE — 85025 COMPLETE CBC W/AUTO DIFF WBC: CPT

## 2018-09-13 PROCEDURE — 86850 RBC ANTIBODY SCREEN: CPT

## 2018-09-13 PROCEDURE — 86592 SYPHILIS TEST NON-TREP QUAL: CPT

## 2018-09-13 PROCEDURE — 82947 ASSAY GLUCOSE BLOOD QUANT: CPT

## 2018-09-13 PROCEDURE — 87660 TRICHOMONAS VAGIN DIR PROBE: CPT

## 2018-09-13 PROCEDURE — 84443 ASSAY THYROID STIM HORMONE: CPT

## 2018-09-13 PROCEDURE — 86703 HIV-1/HIV-2 1 RESULT ANTBDY: CPT

## 2018-09-13 PROCEDURE — 86762 RUBELLA ANTIBODY: CPT

## 2018-09-13 PROCEDURE — 82607 VITAMIN B-12: CPT

## 2018-09-13 PROCEDURE — 87491 CHLMYD TRACH DNA AMP PROBE: CPT

## 2018-09-13 PROCEDURE — 87340 HEPATITIS B SURFACE AG IA: CPT

## 2018-09-13 RX ORDER — ONDANSETRON 8 MG/1
8 TABLET, ORALLY DISINTEGRATING ORAL EVERY 8 HOURS PRN
Qty: 12 TABLET | Refills: 1 | Status: SHIPPED | OUTPATIENT
Start: 2018-09-13 | End: 2018-10-09 | Stop reason: SDUPTHER

## 2018-09-13 NOTE — PROGRESS NOTES
She complains of nausea.  OB packet given last visit.  She and partner are considering termination.  OB labs today.

## 2018-09-13 NOTE — PROCEDURES
Obstetrical ultrasound completed today.  See report in imaging section of [facility].

## 2018-09-14 LAB
HBV SURFACE AG SERPL QL IA: NEGATIVE
HIV 1+2 AB+HIV1 P24 AG SERPL QL IA: NEGATIVE
RPR SER QL: NORMAL
RUBV IGG SER-ACNC: 10.8 IU/ML
RUBV IGG SER-IMP: REACTIVE

## 2018-09-16 LAB — BACTERIA UR CULT: NORMAL

## 2018-09-17 DIAGNOSIS — B96.89 BV (BACTERIAL VAGINOSIS): Primary | ICD-10-CM

## 2018-09-17 DIAGNOSIS — N76.0 BV (BACTERIAL VAGINOSIS): Primary | ICD-10-CM

## 2018-09-17 LAB
C TRACH DNA SPEC QL NAA+PROBE: NOT DETECTED
N GONORRHOEA DNA SPEC QL NAA+PROBE: NOT DETECTED

## 2018-09-17 RX ORDER — METRONIDAZOLE 7.5 MG/G
GEL TOPICAL
Qty: 1 TUBE | Refills: 1 | Status: SHIPPED | OUTPATIENT
Start: 2018-09-17 | End: 2018-10-17

## 2018-10-09 DIAGNOSIS — N92.6 MISSED MENSES: ICD-10-CM

## 2018-10-09 DIAGNOSIS — O21.9 NAUSEA/VOMITING IN PREGNANCY: ICD-10-CM

## 2018-10-10 RX ORDER — ONDANSETRON 8 MG/1
8 TABLET, ORALLY DISINTEGRATING ORAL EVERY 8 HOURS PRN
Qty: 12 TABLET | Refills: 0 | Status: SHIPPED | OUTPATIENT
Start: 2018-10-10 | End: 2018-10-22 | Stop reason: SDUPTHER

## 2018-10-17 ENCOUNTER — ROUTINE PRENATAL (OUTPATIENT)
Dept: OBSTETRICS AND GYNECOLOGY | Facility: CLINIC | Age: 23
End: 2018-10-17
Payer: COMMERCIAL

## 2018-10-17 VITALS
SYSTOLIC BLOOD PRESSURE: 106 MMHG | DIASTOLIC BLOOD PRESSURE: 60 MMHG | BODY MASS INDEX: 26.96 KG/M2 | WEIGHT: 147.38 LBS

## 2018-10-17 DIAGNOSIS — Z3A.13 13 WEEKS GESTATION OF PREGNANCY: Primary | ICD-10-CM

## 2018-10-17 DIAGNOSIS — Z34.02 ENCOUNTER FOR SUPERVISION OF NORMAL FIRST PREGNANCY, SECOND TRIMESTER: ICD-10-CM

## 2018-10-17 PROCEDURE — 0502F SUBSEQUENT PRENATAL CARE: CPT | Mod: S$GLB,,, | Performed by: NURSE PRACTITIONER

## 2018-10-17 PROCEDURE — 99999 PR PBB SHADOW E&M-EST. PATIENT-LVL III: CPT | Mod: PBBFAC,,, | Performed by: NURSE PRACTITIONER

## 2018-10-22 DIAGNOSIS — N92.6 MISSED MENSES: ICD-10-CM

## 2018-10-22 DIAGNOSIS — O21.9 NAUSEA/VOMITING IN PREGNANCY: ICD-10-CM

## 2018-10-22 RX ORDER — ONDANSETRON 8 MG/1
8 TABLET, ORALLY DISINTEGRATING ORAL EVERY 8 HOURS PRN
Qty: 30 TABLET | Refills: 0 | Status: SHIPPED | OUTPATIENT
Start: 2018-10-22 | End: 2018-11-20 | Stop reason: SDUPTHER

## 2018-10-22 NOTE — PROGRESS NOTES
Doing well today and here for routine OB visit, accompanied by her mother and sister.  FHT's heard with doppler - patient teared up and smiled throughout listening, as this was first time hearing heartbeat.  Patient did not specifically verbalize, but appears to have decided against termination, as she and her mother were happy & talking about future with baby.  No complaints today.  Bleeding/cramping prec given.

## 2018-10-31 ENCOUNTER — TELEPHONE (OUTPATIENT)
Dept: OBSTETRICS AND GYNECOLOGY | Facility: CLINIC | Age: 23
End: 2018-10-31

## 2018-10-31 NOTE — TELEPHONE ENCOUNTER
Swing OB, 15 weeks and passed out on Saturday. Pt says everything went black for a minute and she couldn't see even though her eyes were open.

## 2018-10-31 NOTE — TELEPHONE ENCOUNTER
15 2/7 week OB passed out Saturday after being over heated.  She did not fall.  She said everything went black but her eyes open.  Since then the nausea has been worse, throwing up most days.  She was only vomiting once weekly which was better compared to the beginning of her pregnancy.  She is taking Bonjesta and Zofran PRN.  Recommended she increase PO hydration and if she feels like she is going to passout she should sit down.

## 2018-11-02 ENCOUNTER — TELEPHONE (OUTPATIENT)
Dept: OBSTETRICS AND GYNECOLOGY | Facility: CLINIC | Age: 23
End: 2018-11-02

## 2018-11-02 NOTE — TELEPHONE ENCOUNTER
Spoke to patient.  She is not vomiting evry day.  It is improving.  No fever or flank pain.  Can increase to 8 mg zofran TID prn.  She will call if it gets worse

## 2018-11-09 ENCOUNTER — TELEPHONE (OUTPATIENT)
Dept: INTERNAL MEDICINE | Facility: CLINIC | Age: 23
End: 2018-11-09

## 2018-11-09 ENCOUNTER — TELEPHONE (OUTPATIENT)
Dept: OBSTETRICS AND GYNECOLOGY | Facility: CLINIC | Age: 23
End: 2018-11-09

## 2018-11-09 NOTE — TELEPHONE ENCOUNTER
----- Message from Priyanka Alvarado sent at 11/9/2018  8:47 AM CST -----  Contact: [phone]/self  Patient is 16 weeks pregnant and states her OB  told her to be seen today by her PCP for shortness of breath, dizziness and congestion. Please call and advise.

## 2018-11-09 NOTE — TELEPHONE ENCOUNTER
Please inform her that I would recommend to stay with something as simple as nasal saline and takes some warm showers to try to open up and clear up her sinus passages.  Also I would like for her to communicate her send a message to her OB on Monday to see if they have a list of medications that they are comfortable with during her pregnancy as this is fairly standard.

## 2018-11-09 NOTE — TELEPHONE ENCOUNTER
For the past several days she has been dizzy, scared to drive her car.  Dizziness makes her nauseated.  She blacked out this AM after going from a lay to standing position slowly.  She is SOB when walking at work for the past couple days as well.  She is congested, has taken Zyrtec.  No cough.  Recommended she see PCP or go to urgent care.

## 2018-11-09 NOTE — TELEPHONE ENCOUNTER
Spoke with pt and states has nasal congestion and headache due to sinus issues. Pt states she is SOB due to pregnacy and breathing through mouth due to nose stopped up. Pt states she has no chest congestion. Denies fever. Pt states asked OB what to take but was referred to PCP. Please advise.

## 2018-11-09 NOTE — TELEPHONE ENCOUNTER
Dr Plata pt calling, Ob 16wks yesterday started with shortness of breathe and dizziness and to the point of scared to drive her car yesterday and today. Pt also said she blacked out for a second. Pt # [phone]

## 2018-11-12 ENCOUNTER — TELEPHONE (OUTPATIENT)
Dept: OBSTETRICS AND GYNECOLOGY | Facility: CLINIC | Age: 23
End: 2018-11-12

## 2018-11-12 NOTE — TELEPHONE ENCOUNTER
17 week OB has been out of work since last week with dizziness.  She called PCP who did not want to see her.  She needs a note to return to work.  Scheduled with Angelina tomorrow.  She reports taking Claritin, Dramamine, and drinking plenty of water.

## 2018-11-12 NOTE — TELEPHONE ENCOUNTER
Dr Plata pt ,ob 17wks pt called on Thursday 11-8-18 regarding dizzy spells and to go to a PCP. Her pcp wants there to see us and she hasn't been to work since last Thursday 11-8-18.her work wants a note she is cleared to come back be she hasn't seen anyone. Pt # [phone]

## 2018-11-13 ENCOUNTER — LAB VISIT (OUTPATIENT)
Dept: LAB | Facility: HOSPITAL | Age: 23
End: 2018-11-13
Attending: OBSTETRICS & GYNECOLOGY
Payer: COMMERCIAL

## 2018-11-13 ENCOUNTER — ROUTINE PRENATAL (OUTPATIENT)
Dept: OBSTETRICS AND GYNECOLOGY | Facility: CLINIC | Age: 23
End: 2018-11-13
Payer: COMMERCIAL

## 2018-11-13 VITALS — WEIGHT: 147.81 LBS | SYSTOLIC BLOOD PRESSURE: 98 MMHG | DIASTOLIC BLOOD PRESSURE: 62 MMHG | BODY MASS INDEX: 27.04 KG/M2

## 2018-11-13 DIAGNOSIS — R55 SYNCOPE, UNSPECIFIED SYNCOPE TYPE: ICD-10-CM

## 2018-11-13 DIAGNOSIS — I95.1 POSTURAL HYPOTENSION: ICD-10-CM

## 2018-11-13 DIAGNOSIS — Z3A.17 17 WEEKS GESTATION OF PREGNANCY: ICD-10-CM

## 2018-11-13 DIAGNOSIS — Z36.89 ENCOUNTER FOR FETAL ANATOMIC SURVEY: ICD-10-CM

## 2018-11-13 DIAGNOSIS — Z3A.17 17 WEEKS GESTATION OF PREGNANCY: Primary | ICD-10-CM

## 2018-11-13 LAB
ALBUMIN SERPL BCP-MCNC: 3.3 G/DL
ALP SERPL-CCNC: 39 U/L
ALT SERPL W/O P-5'-P-CCNC: 10 U/L
ANION GAP SERPL CALC-SCNC: 11 MMOL/L
AST SERPL-CCNC: 15 U/L
BASOPHILS # BLD AUTO: 0.04 K/UL
BASOPHILS NFR BLD: 0.4 %
BILIRUB SERPL-MCNC: 0.2 MG/DL
BUN SERPL-MCNC: 6 MG/DL
CALCIUM SERPL-MCNC: 9.3 MG/DL
CHLORIDE SERPL-SCNC: 101 MMOL/L
CO2 SERPL-SCNC: 20 MMOL/L
CREAT SERPL-MCNC: 0.6 MG/DL
DIFFERENTIAL METHOD: ABNORMAL
EOSINOPHIL # BLD AUTO: 0.3 K/UL
EOSINOPHIL NFR BLD: 3 %
ERYTHROCYTE [DISTWIDTH] IN BLOOD BY AUTOMATED COUNT: 13.3 %
EST. GFR  (AFRICAN AMERICAN): >60 ML/MIN/1.73 M^2
EST. GFR  (NON AFRICAN AMERICAN): >60 ML/MIN/1.73 M^2
GLUCOSE SERPL-MCNC: 83 MG/DL
HCT VFR BLD AUTO: 38.2 %
HGB BLD-MCNC: 13.1 G/DL
IMM GRANULOCYTES # BLD AUTO: 0.03 K/UL
IMM GRANULOCYTES NFR BLD AUTO: 0.3 %
LYMPHOCYTES # BLD AUTO: 1.6 K/UL
LYMPHOCYTES NFR BLD: 14.6 %
MCH RBC QN AUTO: 28.5 PG
MCHC RBC AUTO-ENTMCNC: 34.3 G/DL
MCV RBC AUTO: 83 FL
MONOCYTES # BLD AUTO: 0.5 K/UL
MONOCYTES NFR BLD: 4.8 %
NEUTROPHILS # BLD AUTO: 8.5 K/UL
NEUTROPHILS NFR BLD: 76.9 %
NRBC BLD-RTO: 0 /100 WBC
PLATELET # BLD AUTO: 286 K/UL
PMV BLD AUTO: 10.2 FL
POTASSIUM SERPL-SCNC: 4 MMOL/L
PROT SERPL-MCNC: 7.2 G/DL
RBC # BLD AUTO: 4.6 M/UL
SODIUM SERPL-SCNC: 132 MMOL/L
WBC # BLD AUTO: 11.09 K/UL

## 2018-11-13 PROCEDURE — 85025 COMPLETE CBC W/AUTO DIFF WBC: CPT

## 2018-11-13 PROCEDURE — 80053 COMPREHEN METABOLIC PANEL: CPT

## 2018-11-13 PROCEDURE — 90686 IIV4 VACC NO PRSV 0.5 ML IM: CPT | Mod: S$GLB,,, | Performed by: NURSE PRACTITIONER

## 2018-11-13 PROCEDURE — 0502F SUBSEQUENT PRENATAL CARE: CPT | Mod: S$GLB,,, | Performed by: NURSE PRACTITIONER

## 2018-11-13 PROCEDURE — 99999 PR PBB SHADOW E&M-EST. PATIENT-LVL II: CPT | Mod: PBBFAC,,, | Performed by: NURSE PRACTITIONER

## 2018-11-13 PROCEDURE — 90471 IMMUNIZATION ADMIN: CPT | Mod: S$GLB,,, | Performed by: NURSE PRACTITIONER

## 2018-11-14 NOTE — PROGRESS NOTES
"Presents today with c/o at least 3 episodes in which she "blacked out" or passed over past 2-3 weeks.  Denies SOB, chest pain, or heart palpitations.  States that all 3 times she has been well hydrated, and had eaten.  Over past 2 weeks, nausea and vomiting has not been an issue.  Denies any more n/v over past 2 weeks.  She thinks it could be related to a postural hypotenion, so has been careful when getting up/down or changing positions.  With all 3 episodes of "black outs", she states those around her deny any sort of seizure-like activity/behavior/mannerisms, and leti when she awakens and opens her eyes, she is AAO x 3.  Edson and his mom have examined her pupils when she blacks out, and states PERRL.  States she currently drinks at least 120 oz. (equiv to 7 bottles of water) daily.     Plan:    1. Increase PO hydration even more, and occasionally alternate water with Gatorade or           something with extra electrolytes in it; also encouraged her to eat good combo of carbs and          protein, and to increase intake of foods with high potassium.  2. Continue changing positions carefully and slowly due to decrease in BP that is common in         2nd trimester.  Ask for permission at work to have water/drink readily available throughout the day to ensure that she stays well hydrated, as well as brief rest period prn.  3. Ordered CBC, CMP today  4.  Enrolled in [employer] today and instructed on where to go to  supplies at [facility].  5.  Orders placed for [facility] u/s for anatomy scan at 20 wks GA.  6.  Bleeding/cramping prec given.  7.  Follow-up with MD if episodes of syncope persist.  "

## 2018-11-15 NOTE — PROGRESS NOTES
,   This patient of 's is 17 wks pregnant and has had 3 episodes of blacking out (or passing out) - see my OB note from her visit.  She felt it was possibly related to  Her BP starting to drop, which she noticed more when changing positions (sitting - standing, etc.).  I did a CBC and CMP and just wanted you to look over it and make sure everything looks okay to you too.  I encouraged her to increase PO hydration, and to alternate water with drinks like Gatorade that have electrolytes in it sometimes too.  Anything else you think I should do?  She has a routine OB visit scheduled with Boni in 1 week.  Thanks,   Angelina

## 2018-11-16 ENCOUNTER — PATIENT MESSAGE (OUTPATIENT)
Dept: ADMINISTRATIVE | Facility: OTHER | Age: 23
End: 2018-11-16

## 2018-11-20 ENCOUNTER — PATIENT OUTREACH (OUTPATIENT)
Dept: OTHER | Facility: OTHER | Age: 23
End: 2018-11-20

## 2018-11-20 DIAGNOSIS — N92.6 MISSED MENSES: ICD-10-CM

## 2018-11-20 DIAGNOSIS — O21.9 NAUSEA/VOMITING IN PREGNANCY: ICD-10-CM

## 2018-11-20 RX ORDER — ONDANSETRON 8 MG/1
8 TABLET, ORALLY DISINTEGRATING ORAL EVERY 8 HOURS PRN
Qty: 30 TABLET | Refills: 0 | Status: ON HOLD | OUTPATIENT
Start: 2018-11-20 | End: 2019-04-21 | Stop reason: HOSPADM

## 2018-11-21 NOTE — TELEPHONE ENCOUNTER
Initial introduction with MsYur[facility] Makenna Henriquez completed and the role of the health coaches for [employer] was explained via [employer] [employer].    Reviewed the importance of taking weights and blood pressure readings, using the health  as a resource for physical activity and dietary habits, and that [facility] messages will be sent for weeks 20, 30, and 37 home urine tests.  Reviewed that the patient should contact her OB team with any specific questions regarding her pregnancy, and to contact [facility] or 911 in the case of a medical emergency.

## 2018-11-27 ENCOUNTER — ROUTINE PRENATAL (OUTPATIENT)
Dept: OBSTETRICS AND GYNECOLOGY | Facility: CLINIC | Age: 23
End: 2018-11-27
Payer: COMMERCIAL

## 2018-11-27 VITALS
SYSTOLIC BLOOD PRESSURE: 112 MMHG | WEIGHT: 154.31 LBS | DIASTOLIC BLOOD PRESSURE: 76 MMHG | BODY MASS INDEX: 28.23 KG/M2

## 2018-11-27 DIAGNOSIS — Z3A.19 19 WEEKS GESTATION OF PREGNANCY: Primary | ICD-10-CM

## 2018-11-27 PROCEDURE — 0502F SUBSEQUENT PRENATAL CARE: CPT | Mod: S$GLB,,, | Performed by: OBSTETRICS & GYNECOLOGY

## 2018-11-27 PROCEDURE — 99999 PR PBB SHADOW E&M-EST. PATIENT-LVL III: CPT | Mod: PBBFAC,,, | Performed by: OBSTETRICS & GYNECOLOGY

## 2018-11-29 ENCOUNTER — PROCEDURE VISIT (OUTPATIENT)
Dept: MATERNAL FETAL MEDICINE | Facility: CLINIC | Age: 23
End: 2018-11-29
Payer: COMMERCIAL

## 2018-11-29 DIAGNOSIS — Z36.89 ENCOUNTER FOR FETAL ANATOMIC SURVEY: ICD-10-CM

## 2018-11-29 PROCEDURE — 76805 OB US >/= 14 WKS SNGL FETUS: CPT | Mod: S$GLB,,, | Performed by: OBSTETRICS & GYNECOLOGY

## 2018-11-29 PROCEDURE — 99499 UNLISTED E&M SERVICE: CPT | Mod: S$GLB,,, | Performed by: OBSTETRICS & GYNECOLOGY

## 2018-12-10 ENCOUNTER — OFFICE VISIT (OUTPATIENT)
Dept: FAMILY MEDICINE | Facility: CLINIC | Age: 23
End: 2018-12-10
Attending: FAMILY MEDICINE
Payer: COMMERCIAL

## 2018-12-10 VITALS
TEMPERATURE: 97 F | WEIGHT: 153.69 LBS | BODY MASS INDEX: 28.28 KG/M2 | HEIGHT: 62 IN | DIASTOLIC BLOOD PRESSURE: 58 MMHG | SYSTOLIC BLOOD PRESSURE: 100 MMHG | OXYGEN SATURATION: 97 % | HEART RATE: 101 BPM

## 2018-12-10 DIAGNOSIS — K64.4 EXTERNAL HEMORRHOIDS: ICD-10-CM

## 2018-12-10 DIAGNOSIS — K59.00 CONSTIPATION, UNSPECIFIED CONSTIPATION TYPE: Primary | ICD-10-CM

## 2018-12-10 PROCEDURE — 3008F BODY MASS INDEX DOCD: CPT | Mod: CPTII,S$GLB,, | Performed by: FAMILY MEDICINE

## 2018-12-10 PROCEDURE — 99213 OFFICE O/P EST LOW 20 MIN: CPT | Mod: S$GLB,,, | Performed by: FAMILY MEDICINE

## 2018-12-10 PROCEDURE — 99999 PR PBB SHADOW E&M-EST. PATIENT-LVL III: CPT | Mod: PBBFAC,,, | Performed by: FAMILY MEDICINE

## 2018-12-10 RX ORDER — HYDROCORTISONE ACETATE 25 MG/1
25 SUPPOSITORY RECTAL 2 TIMES DAILY
Qty: 20 SUPPOSITORY | Refills: 0 | Status: SHIPPED | OUTPATIENT
Start: 2018-12-10 | End: 2018-12-20

## 2018-12-10 RX ORDER — LACTULOSE 10 G/15ML
10 SOLUTION ORAL 2 TIMES DAILY
Qty: 300 ML | Refills: 0 | Status: SHIPPED | OUTPATIENT
Start: 2018-12-10 | End: 2018-12-20

## 2018-12-11 NOTE — PATIENT INSTRUCTIONS
Hemorrhoids    Hemorrhoids are swollen and inflamed veins inside the rectum and near the anus. The rectum is the last several inches of the colon. The anus is the passage between the rectum and the outside of the body.  Causes  The veins can become swollen due to increased pressure in them. This is most often caused by:  · Chronic constipation or diarrhea  · Straining when having a bowel movement  · Sitting too long on the toilet  · A low-fiber diet  · Pregnancy  Symptoms  · Bleeding from the rectum (this may be noticeable after bowel movements)  · Lump near the anus  · Itching around the anus  · Pain around the anus  There are different types of hemorrhoids. Depending on the type you have and the severity, you may be able to treat yourself at home. In some cases, a procedure may be the best treatment option. Your healthcare provider can tell you more about this, if needed.  Home care  General care  · To get relief from pain or itching, try:  ¨ Topical products. Your healthcare provider may prescribe or recommend creams, ointments, or pads that can be applied to the hemorrhoid. Use these exactly as directed.  ¨ Medicines. Your healthcare provider may recommend stool softeners, suppositories, or laxatives to help manage constipation. Use these exactly as directed.  ¨ Sitz baths. A sitz bath involves sitting in a few inches of warm bath water. Be careful not to make the water so hot that you burn yourself--test it before sitting in it. Soak for about 10 to 15 minutes a few times a day. This may help relieve pain.  Tips to help prevent hemorrhoids  · Eat more fiber. Fiber adds bulk to stool and absorbs water as it moves through your colon. This makes stool softer and easier to pass.  ¨ Increase the fiber in your diet with more fiber-rich foods. These include fresh fruit, vegetables, and whole grains.  ¨ Take a fiber supplement or bulking agent, if advised to by your provider. These include products such as psyllium  or methylcellulose.  · Drink plenty of water, if directed to by your provider. This can help keep stool soft.  · Be more active. Frequent exercise aids digestion and helps prevent constipation. It may also help make bowel movements more regular.  · Dont strain during bowel movements. This can make hemorrhoids more likely. Also, dont sit on the toilet for long periods of time.  Follow-up care  Follow up with your healthcare provider, or as advised. If a culture or imaging tests were done, you will be notified of the results when they are ready. This may take a few days or longer.  When to seek medical advice  Call your healthcare provider right away if any of these occur:  · Increased bleeding from the rectum  · Increased pain around the rectum or anus  · Weakness or dizziness  Call 911  Call 911 or return to the emergency department right away if any of these occur:  · Trouble breathing or swallowing  · Fainting or loss of consciousness  · Unusually fast heart rate  · Vomiting blood  · Large amounts of blood in stool  Date Last Reviewed: 6/22/2015 © 2000-2017 [employer]. [address] [place] [address]. All rights reserved. This information is not intended as a substitute for professional medical care. Always follow your healthcare professional's instructions.        Treating Hemorrhoids: Self-Care    Follow your healthcare providers advice about caring for your hemorrhoids at home. Some treatments help relieve symptoms right away. Others involve making changes in your diet and exercise habits. These can help ease constipation and prevent hemorrhoid symptoms from coming back.  Relieving symptoms  Your healthcare provider may prescribe anti-inflammatory medicine to help ease your symptoms. The following tips will also help relieve pain and swelling.  · Take sitz baths. Taking a sitz bath means sitting in a few inches of warm bath water. Soaking for 10 minutes twice a day can provide  welcome relief from painful hemorrhoids. It can also help the area stay clean.  · Develop good bowel habits. Use the bathroom when you need to. Dont ignore the urge to move your bowels. This can lead to constipation, hard stools, and straining. Also, dont read while on the toilet. Sit only as long as needed. Wipe gently with soft, unscented toilet tissue or baby wipes.  · Use ice packs. Placing an ice pack on a thrombosed external hemorrhoid can help relieve pain right away. It will also help reduce the blood clot. Use the ice for 15 to 20 minutes at a time. Keep a cloth between the ice and your skin to prevent skin damage.  · Use other measures. Laxatives and enemas can help ease constipation. But use them only on your healthcare providers advice. For symptom relief, try using cotton pads soaked in witch hazel. These are available at most drugstores. Over-the-counter hemorrhoid ointments and petroleum jelly can also provide relief.  Add fiber to your diet  Adding fiber to your diet can help relieve constipation by making stools softer and easier to pass. To increase your fiber intake, your healthcare provider may recommend a bulking agent, such as psyllium. This is a high-fiber supplement available at most grocery stores and drugstores. Eating more fiber-rich foods will also help. There are two types of fiber:  · Insoluble fiber is the main ingredient in bulking agents. Its also found in foods such as wheat bran, whole-grain breads, fresh fruits, and vegetables.  · Soluble fiber is found in foods such as oat bran. Although soluble fiber is good for you, it may not ease constipation as much as foods high in insoluble fiber.  Drink more water  Along with a high-fiber diet, drinking more water can help ease constipation. This is because insoluble fiber absorbs water, making stools soft and bulky. Be sure to drink plenty of water throughout the day. Drinking fruit juices, such as prune juice or apple juice, can  also help prevent constipation.  Get more exercise  Regular exercise aids digestion and helps prevent constipation. Its also great for your health. So talk with your healthcare provider about starting an exercise program. Low-impact activities, such as swimming or walking, are good places to start. Take it easy at first. And remember to drink plenty of water when you exercise.  High-fiber foods  High-fiber foods offer many benefits. By making your stools softer, they help heal and prevent swollen hemorrhoids. They may also help reduce the risk of colon and rectal cancer. Best of all, theyre usually low in calories and taste great. Here are some examples of fiber-rich foods.  · Whole grains, such as wheat bran, corn bran, and brown rice.  · Vegetables, especially carrots, broccoli, cabbage, and peas.  · Fruits, such as apples, bananas, raisins, peaches, and pears.  · Nuts and legumes, especially peanuts, lentils, and kidney beans.  Easy ways to add fiber  The tips below offer some simple ways to add more high-fiber foods to your meals.  · Start your day with a high-fiber breakfast. Eat a wheat bran cereal along with a sliced banana. Or, try peanut butter on whole-wheat toast.  · Eat carrot sticks for snacks. Theyre easy to prepare, taste great, and are low in calories.  · Use whole-grain breads instead of white bread for sandwiches.  · Eat fruits for treats. Try an apple and some raisins instead of a candy bar.   Date Last Reviewed: 7/1/2016  © 2948-0425 [employer]. [address] [place] [address], [place] [address]. All rights reserved. This information is not intended as a substitute for professional medical care. Always follow your healthcare professional's instructions.        Treating Constipation    Constipation is a common and often uncomfortable problem. Constipation means you have bowel movements fewer than 3 times per week, or strain to pass hard, dry stool. It can last a short time. Or it can be a  problem that never seems to go away. The good news is that it can often be treated and controlled.  Eat more fiber  One of the best ways to help treat constipation is to increase your fiber intake. You can do this either through diet or by using fiber supplements. Fiber (in whole grains, fruits, and vegetables) adds bulk and absorbs water to soften the stool. This helps the stool pass through the colon more easily. When you increase your fiber intake, do it slowly to avoid side effects such as bloating. Also increase the amount of water that you drink. Eating more of the following foods can add fiber to your diet.  · High-fiber cereals  · Whole grains, bran, and brown rice  · Vegetables such as carrots, broccoli, and greens  · Fresh fruits (especially apples, pears, and dried fruits like raisins and apricots)  · Nuts and legumes (especially beans such as lentils, kidney beans, and lima beans)  Get physically active  Exercise helps improve the working of your colon which helps ease constipation. Try to get some physical activity every day. If you havent been active for a while, talk to your healthcare provider before starting again.  Laxatives  Your healthcare provider may suggest an over-the-counter product to help ease your constipation. He or she may suggest the use of bulk-forming agents or laxatives. The use of laxatives, if used as directed, is common and safe. Follow directions carefully when using them. See your healthcare provider for new-onset constipation, or long-term constipation, to rule out other causes such as medicines or thyroid disease.  Date Last Reviewed: 7/1/2016 © 2000-2017 [employer]. [address] [place] [address], [place] [address]. All rights reserved. This information is not intended as a substitute for professional medical care. Always follow your healthcare professional's instructions.

## 2018-12-11 NOTE — PROGRESS NOTES
Subjective:       Patient ID: Makenna Henriquez is a 23 y.o. female.    Chief Complaint: Constipation and Hemorrhoids    23 yr old pleasant white female, 21 week pregnant with no significant medical history presents today as new patient to me and for evaluation of constipation and hemorrhoid. Onset a month ago and gradually worsening. No rectal bleed, or urine trouble. She tried all OTC meds and manual disimpaction, enema with no relief. She also has external hemorrhoid. Details as follows -          Constipation   This is a chronic problem. The current episode started more than 1 month ago. The problem is unchanged. Her stool frequency is 1 time per week or less. The stool is described as formed. The patient is on a high fiber diet. She exercises regularly. There has been adequate water intake. Associated symptoms include hemorrhoids. Pertinent negatives include no abdominal pain, anorexia, bloating, difficulty urinating, fecal incontinence, flatus, melena, rectal pain or weight loss. She has tried enemas, fiber, laxatives, manual disimpaction and stool softeners for the symptoms. The treatment provided no relief. There is no history of abdominal surgery, inflammatory bowel disease, metabolic disease, neurologic disease, neuromuscular disease or radiation treatment.     Review of Systems   Constitutional: Negative for weight loss.   Gastrointestinal: Positive for constipation and hemorrhoids. Negative for abdominal pain, anorexia, bloating, flatus, melena and rectal pain.        Hemorrhoid+   Genitourinary: Negative for difficulty urinating.           Objective:       Vitals:    12/10/18 1747   BP: (!) 100/58   Pulse: 101   Temp: 97.4 °F (36.3 °C)       Physical Exam   Constitutional: She is oriented to person, place, and time. She appears well-developed and well-nourished. No distress.   HENT:   Head: Normocephalic and atraumatic.   Right Ear: External ear normal.   Left Ear: External ear normal.   Nose: Nose normal.    Mouth/Throat: Oropharynx is clear and moist. No oropharyngeal exudate.   Eyes: Conjunctivae and EOM are normal. Pupils are equal, round, and reactive to light. Right eye exhibits no discharge. Left eye exhibits no discharge. No scleral icterus.   Neck: Normal range of motion. Neck supple. No JVD present. No tracheal deviation present. No thyromegaly present.   Cardiovascular: Normal rate, regular rhythm, normal heart sounds and intact distal pulses. Exam reveals no gallop and no friction rub.   No murmur heard.  Pulmonary/Chest: Effort normal and breath sounds normal. No stridor. She has no wheezes. She has no rales. She exhibits no tenderness.   Abdominal: Soft. Bowel sounds are normal. She exhibits no distension and no mass. There is no tenderness. There is no rebound and no guarding. No hernia.   Musculoskeletal: Normal range of motion. She exhibits no edema or tenderness.   Lymphadenopathy:     She has no cervical adenopathy.   Neurological: She is alert and oriented to person, place, and time. She has normal reflexes. She displays normal reflexes. No cranial nerve deficit. She exhibits normal muscle tone. Coordination normal.   Skin: Skin is warm and dry. No rash noted. She is not diaphoretic. No erythema. No pallor.   Psychiatric: She has a normal mood and affect. Her behavior is normal. Judgment and thought content normal.       Assessment:       1. Constipation, unspecified constipation type    2. External hemorrhoids        Plan:       Makenna was seen today for constipation and hemorrhoids.    Diagnoses and all orders for this visit:    Constipation, unspecified constipation type  -     lactulose (CHRONULAC) 20 gram/30 mL Soln; Take 15 mLs (10 g total) by mouth 2 (two) times daily. for 10 days    External hemorrhoids  -     hydrocortisone (ANUSOL-HC) 25 mg suppository; Place 1 suppository (25 mg total) rectally 2 (two) times daily. for 10 days      Constipation  -likely pregnancy related  -high fiber diet,  metamucil  -lactulose for short period as needed    External Hemorrhoid  -proctosol -0 rectal application - use as directed    Spent adequate time in obtaining history and explaining differentials    20 minutes spent during this visit of which greater than 50% devoted to face-face counseling and coordination of care regarding diagnosis and management plan    Follow-up if symptoms worsen or fail to improve.\

## 2018-12-24 ENCOUNTER — ROUTINE PRENATAL (OUTPATIENT)
Dept: OBSTETRICS AND GYNECOLOGY | Facility: CLINIC | Age: 23
End: 2018-12-24
Payer: COMMERCIAL

## 2018-12-24 ENCOUNTER — PATIENT MESSAGE (OUTPATIENT)
Dept: ADMINISTRATIVE | Facility: OTHER | Age: 23
End: 2018-12-24

## 2018-12-24 VITALS — WEIGHT: 155.31 LBS | SYSTOLIC BLOOD PRESSURE: 98 MMHG | BODY MASS INDEX: 28.41 KG/M2 | DIASTOLIC BLOOD PRESSURE: 58 MMHG

## 2018-12-24 DIAGNOSIS — Z34.02 ENCOUNTER FOR SUPERVISION OF NORMAL FIRST PREGNANCY IN SECOND TRIMESTER: ICD-10-CM

## 2018-12-24 DIAGNOSIS — Z3A.23 23 WEEKS GESTATION OF PREGNANCY: Primary | ICD-10-CM

## 2018-12-24 PROCEDURE — 99999 PR PBB SHADOW E&M-EST. PATIENT-LVL III: CPT | Mod: PBBFAC,,, | Performed by: OBSTETRICS & GYNECOLOGY

## 2018-12-24 PROCEDURE — 0502F SUBSEQUENT PRENATAL CARE: CPT | Mod: S$GLB,,, | Performed by: OBSTETRICS & GYNECOLOGY

## 2018-12-24 NOTE — PROGRESS NOTES
She has a hemorrhoid.  Fairly painful.  Prep H helping a little.  She has had them before pregnancy  7 mth labs  The patient is scheduled for a f/u MFM exam to complete the anatomic survey this week to look at spine.

## 2018-12-27 ENCOUNTER — PROCEDURE VISIT (OUTPATIENT)
Dept: MATERNAL FETAL MEDICINE | Facility: CLINIC | Age: 23
End: 2018-12-27
Payer: COMMERCIAL

## 2018-12-27 PROCEDURE — 99499 UNLISTED E&M SERVICE: CPT | Mod: S$GLB,,, | Performed by: OBSTETRICS & GYNECOLOGY

## 2018-12-27 PROCEDURE — 76816 OB US FOLLOW-UP PER FETUS: CPT | Mod: S$GLB,,, | Performed by: OBSTETRICS & GYNECOLOGY

## 2019-01-22 ENCOUNTER — LAB VISIT (OUTPATIENT)
Dept: LAB | Facility: HOSPITAL | Age: 24
End: 2019-01-22
Attending: OBSTETRICS & GYNECOLOGY
Payer: COMMERCIAL

## 2019-01-22 ENCOUNTER — ROUTINE PRENATAL (OUTPATIENT)
Dept: OBSTETRICS AND GYNECOLOGY | Facility: CLINIC | Age: 24
End: 2019-01-22
Payer: COMMERCIAL

## 2019-01-22 VITALS
SYSTOLIC BLOOD PRESSURE: 110 MMHG | DIASTOLIC BLOOD PRESSURE: 66 MMHG | BODY MASS INDEX: 29.98 KG/M2 | WEIGHT: 163.94 LBS

## 2019-01-22 DIAGNOSIS — Z34.02 ENCOUNTER FOR SUPERVISION OF NORMAL FIRST PREGNANCY IN SECOND TRIMESTER: ICD-10-CM

## 2019-01-22 DIAGNOSIS — Z34.03 ENCOUNTER FOR SUPERVISION OF NORMAL FIRST PREGNANCY IN THIRD TRIMESTER: ICD-10-CM

## 2019-01-22 DIAGNOSIS — Z3A.27 27 WEEKS GESTATION OF PREGNANCY: Primary | ICD-10-CM

## 2019-01-22 LAB
BASOPHILS # BLD AUTO: 0.03 K/UL
BASOPHILS NFR BLD: 0.3 %
DIFFERENTIAL METHOD: ABNORMAL
EOSINOPHIL # BLD AUTO: 0.4 K/UL
EOSINOPHIL NFR BLD: 3.1 %
ERYTHROCYTE [DISTWIDTH] IN BLOOD BY AUTOMATED COUNT: 13.1 %
GLUCOSE SERPL-MCNC: 87 MG/DL
HCT VFR BLD AUTO: 32.2 %
HGB BLD-MCNC: 10.7 G/DL
IMM GRANULOCYTES # BLD AUTO: 0.08 K/UL
IMM GRANULOCYTES NFR BLD AUTO: 0.7 %
LYMPHOCYTES # BLD AUTO: 2 K/UL
LYMPHOCYTES NFR BLD: 17.5 %
MCH RBC QN AUTO: 28.2 PG
MCHC RBC AUTO-ENTMCNC: 33.2 G/DL
MCV RBC AUTO: 85 FL
MONOCYTES # BLD AUTO: 0.9 K/UL
MONOCYTES NFR BLD: 7.7 %
NEUTROPHILS # BLD AUTO: 8.2 K/UL
NEUTROPHILS NFR BLD: 70.7 %
NRBC BLD-RTO: 0 /100 WBC
PLATELET # BLD AUTO: 307 K/UL
PMV BLD AUTO: 10.1 FL
RBC # BLD AUTO: 3.8 M/UL
WBC # BLD AUTO: 11.56 K/UL

## 2019-01-22 PROCEDURE — 0502F SUBSEQUENT PRENATAL CARE: CPT | Mod: S$GLB,,, | Performed by: OBSTETRICS & GYNECOLOGY

## 2019-01-22 PROCEDURE — 82950 GLUCOSE TEST: CPT

## 2019-01-22 PROCEDURE — 85025 COMPLETE CBC W/AUTO DIFF WBC: CPT

## 2019-01-22 PROCEDURE — 0502F PR SUBSEQUENT PRENATAL CARE: ICD-10-PCS | Mod: S$GLB,,, | Performed by: OBSTETRICS & GYNECOLOGY

## 2019-01-22 PROCEDURE — 99999 PR PBB SHADOW E&M-EST. PATIENT-LVL II: ICD-10-PCS | Mod: PBBFAC,,, | Performed by: OBSTETRICS & GYNECOLOGY

## 2019-01-22 PROCEDURE — 99999 PR PBB SHADOW E&M-EST. PATIENT-LVL II: CPT | Mod: PBBFAC,,, | Performed by: OBSTETRICS & GYNECOLOGY

## 2019-02-05 ENCOUNTER — ROUTINE PRENATAL (OUTPATIENT)
Dept: OBSTETRICS AND GYNECOLOGY | Facility: CLINIC | Age: 24
End: 2019-02-05
Payer: COMMERCIAL

## 2019-02-05 ENCOUNTER — CLINICAL SUPPORT (OUTPATIENT)
Dept: OBSTETRICS AND GYNECOLOGY | Facility: CLINIC | Age: 24
End: 2019-02-05
Payer: COMMERCIAL

## 2019-02-05 VITALS — SYSTOLIC BLOOD PRESSURE: 110 MMHG | BODY MASS INDEX: 29.9 KG/M2 | WEIGHT: 163.5 LBS | DIASTOLIC BLOOD PRESSURE: 64 MMHG

## 2019-02-05 DIAGNOSIS — Z23 NEED FOR TDAP VACCINATION: Primary | ICD-10-CM

## 2019-02-05 DIAGNOSIS — Z3A.29 29 WEEKS GESTATION OF PREGNANCY: Primary | ICD-10-CM

## 2019-02-05 DIAGNOSIS — Z34.03 ENCOUNTER FOR SUPERVISION OF NORMAL FIRST PREGNANCY IN THIRD TRIMESTER: ICD-10-CM

## 2019-02-05 PROCEDURE — 99999 PR PBB SHADOW E&M-EST. PATIENT-LVL I: ICD-10-PCS | Mod: PBBFAC,,,

## 2019-02-05 PROCEDURE — 90471 TDAP VACCINE GREATER THAN OR EQUAL TO 7YO IM: ICD-10-PCS | Mod: S$GLB,,, | Performed by: OBSTETRICS & GYNECOLOGY

## 2019-02-05 PROCEDURE — 99999 PR PBB SHADOW E&M-EST. PATIENT-LVL III: ICD-10-PCS | Mod: PBBFAC,,, | Performed by: NURSE PRACTITIONER

## 2019-02-05 PROCEDURE — 99999 PR PBB SHADOW E&M-EST. PATIENT-LVL I: CPT | Mod: PBBFAC,,,

## 2019-02-05 PROCEDURE — 90715 TDAP VACCINE GREATER THAN OR EQUAL TO 7YO IM: ICD-10-PCS | Mod: S$GLB,,, | Performed by: OBSTETRICS & GYNECOLOGY

## 2019-02-05 PROCEDURE — 0502F SUBSEQUENT PRENATAL CARE: CPT | Mod: CPTII,S$GLB,, | Performed by: NURSE PRACTITIONER

## 2019-02-05 PROCEDURE — 0502F PR SUBSEQUENT PRENATAL CARE: ICD-10-PCS | Mod: CPTII,S$GLB,, | Performed by: NURSE PRACTITIONER

## 2019-02-05 PROCEDURE — 90715 TDAP VACCINE 7 YRS/> IM: CPT | Mod: S$GLB,,, | Performed by: OBSTETRICS & GYNECOLOGY

## 2019-02-05 PROCEDURE — 90471 IMMUNIZATION ADMIN: CPT | Mod: S$GLB,,, | Performed by: OBSTETRICS & GYNECOLOGY

## 2019-02-05 PROCEDURE — 99999 PR PBB SHADOW E&M-EST. PATIENT-LVL III: CPT | Mod: PBBFAC,,, | Performed by: NURSE PRACTITIONER

## 2019-02-05 RX ORDER — PNV 112/IRON/FOLIC/OM3/DHA/EPA 3.33-.33MG
3 TABLET,CHEWABLE ORAL DAILY
Qty: 90 TABLET | Refills: 11 | Status: SHIPPED | OUTPATIENT
Start: 2019-02-05 | End: 2019-02-21 | Stop reason: ALTCHOICE

## 2019-02-05 NOTE — PROGRESS NOTES
Here for routine OB appt.  Reports occas migraines, and takes Tylenol prn which sometimes help. Gave instructions for taking Magnesium Oxide with Riboflavin (B-6) that she can take for migraine prevention.   Reports good FM.    Denies LOF, denies VB, denies contractions.  Reviewed warning signs of Labor, Bleeding, & Preeclampsia.    Daily FM counts reinforced - decreased FM prec given.  F/U scheduled 2 weeks.  Cannot swallow iron pills; will send in Vitafol gummies.    TDAP given.  Breast pump order given.

## 2019-02-05 NOTE — PROGRESS NOTES
Ordering Provider: Angelina Cook NP    During visit today patient received an injection of Tdap to right deltoid.  Tolerated well.  Instructed pt to remain 15 minutes after injection to monitor for reactions.      Pre pain scale: none    Post pain scale: none

## 2019-02-17 ENCOUNTER — PATIENT MESSAGE (OUTPATIENT)
Dept: ADMINISTRATIVE | Facility: OTHER | Age: 24
End: 2019-02-17

## 2019-02-21 ENCOUNTER — ROUTINE PRENATAL (OUTPATIENT)
Dept: OBSTETRICS AND GYNECOLOGY | Facility: CLINIC | Age: 24
End: 2019-02-21
Payer: COMMERCIAL

## 2019-02-21 VITALS
BODY MASS INDEX: 30.93 KG/M2 | WEIGHT: 169.06 LBS | SYSTOLIC BLOOD PRESSURE: 122 MMHG | DIASTOLIC BLOOD PRESSURE: 80 MMHG

## 2019-02-21 DIAGNOSIS — Z3A.31 31 WEEKS GESTATION OF PREGNANCY: Primary | ICD-10-CM

## 2019-02-21 DIAGNOSIS — Z34.03 ENCOUNTER FOR SUPERVISION OF NORMAL FIRST PREGNANCY, THIRD TRIMESTER: ICD-10-CM

## 2019-02-21 PROCEDURE — 99999 PR PBB SHADOW E&M-EST. PATIENT-LVL III: ICD-10-PCS | Mod: PBBFAC,,, | Performed by: NURSE PRACTITIONER

## 2019-02-21 PROCEDURE — 99999 PR PBB SHADOW E&M-EST. PATIENT-LVL III: CPT | Mod: PBBFAC,,, | Performed by: NURSE PRACTITIONER

## 2019-02-21 PROCEDURE — 0502F SUBSEQUENT PRENATAL CARE: CPT | Mod: CPTII,S$GLB,, | Performed by: NURSE PRACTITIONER

## 2019-02-21 PROCEDURE — 0502F PR SUBSEQUENT PRENATAL CARE: ICD-10-PCS | Mod: CPTII,S$GLB,, | Performed by: NURSE PRACTITIONER

## 2019-02-26 ENCOUNTER — TELEPHONE (OUTPATIENT)
Dept: OBSTETRICS AND GYNECOLOGY | Facility: CLINIC | Age: 24
End: 2019-02-26

## 2019-02-26 NOTE — TELEPHONE ENCOUNTER
Pt said she spoke with someone from [facility] regarding her breast pump and they told her they received the order but it is signed by someone other than her dr so they need a new breast pump order faxed to [phone].

## 2019-02-26 NOTE — TELEPHONE ENCOUNTER
Left voicemail to inform pt that the breast pump order was signed by the nurse practitioner Angelina who she saw at that appointment. Informed her that the order is good because she is apart of our practice and she sees the pt for appointments also.

## 2019-02-26 NOTE — TELEPHONE ENCOUNTER
Needs a new order sent. The one they have isn't going to work . They need Angelina's name signed, printed and her NPI. If you have any questions, please contact the patient. [phone]

## 2019-03-06 ENCOUNTER — ROUTINE PRENATAL (OUTPATIENT)
Dept: OBSTETRICS AND GYNECOLOGY | Facility: CLINIC | Age: 24
End: 2019-03-06
Payer: COMMERCIAL

## 2019-03-06 VITALS — SYSTOLIC BLOOD PRESSURE: 118 MMHG | BODY MASS INDEX: 31.37 KG/M2 | WEIGHT: 171.5 LBS | DIASTOLIC BLOOD PRESSURE: 72 MMHG

## 2019-03-06 DIAGNOSIS — Z34.03 ENCOUNTER FOR SUPERVISION OF NORMAL FIRST PREGNANCY IN THIRD TRIMESTER: ICD-10-CM

## 2019-03-06 DIAGNOSIS — Z3A.33 33 WEEKS GESTATION OF PREGNANCY: Primary | ICD-10-CM

## 2019-03-06 PROCEDURE — 0502F SUBSEQUENT PRENATAL CARE: CPT | Mod: CPTII,S$GLB,, | Performed by: OBSTETRICS & GYNECOLOGY

## 2019-03-06 PROCEDURE — 99999 PR PBB SHADOW E&M-EST. PATIENT-LVL II: CPT | Mod: PBBFAC,,, | Performed by: OBSTETRICS & GYNECOLOGY

## 2019-03-06 PROCEDURE — 99999 PR PBB SHADOW E&M-EST. PATIENT-LVL II: ICD-10-PCS | Mod: PBBFAC,,, | Performed by: OBSTETRICS & GYNECOLOGY

## 2019-03-06 PROCEDURE — 0502F PR SUBSEQUENT PRENATAL CARE: ICD-10-PCS | Mod: CPTII,S$GLB,, | Performed by: OBSTETRICS & GYNECOLOGY

## 2019-03-21 ENCOUNTER — TELEPHONE (OUTPATIENT)
Dept: OBSTETRICS AND GYNECOLOGY | Facility: CLINIC | Age: 24
End: 2019-03-21

## 2019-03-21 ENCOUNTER — ROUTINE PRENATAL (OUTPATIENT)
Dept: OBSTETRICS AND GYNECOLOGY | Facility: CLINIC | Age: 24
End: 2019-03-21
Payer: COMMERCIAL

## 2019-03-21 VITALS — WEIGHT: 178.81 LBS | SYSTOLIC BLOOD PRESSURE: 124 MMHG | DIASTOLIC BLOOD PRESSURE: 82 MMHG | BODY MASS INDEX: 32.7 KG/M2

## 2019-03-21 DIAGNOSIS — Z34.03 ENCOUNTER FOR SUPERVISION OF NORMAL FIRST PREGNANCY IN THIRD TRIMESTER: ICD-10-CM

## 2019-03-21 DIAGNOSIS — Z3A.35 35 WEEKS GESTATION OF PREGNANCY: Primary | ICD-10-CM

## 2019-03-21 PROCEDURE — 99999 PR PBB SHADOW E&M-EST. PATIENT-LVL III: CPT | Mod: PBBFAC,,, | Performed by: OBSTETRICS & GYNECOLOGY

## 2019-03-21 PROCEDURE — 87081 CULTURE SCREEN ONLY: CPT

## 2019-03-21 PROCEDURE — 0502F SUBSEQUENT PRENATAL CARE: CPT | Mod: CPTII,S$GLB,, | Performed by: OBSTETRICS & GYNECOLOGY

## 2019-03-21 PROCEDURE — 0502F PR SUBSEQUENT PRENATAL CARE: ICD-10-PCS | Mod: CPTII,S$GLB,, | Performed by: OBSTETRICS & GYNECOLOGY

## 2019-03-21 PROCEDURE — 99999 PR PBB SHADOW E&M-EST. PATIENT-LVL III: ICD-10-PCS | Mod: PBBFAC,,, | Performed by: OBSTETRICS & GYNECOLOGY

## 2019-03-21 NOTE — TELEPHONE ENCOUNTER
----- Message from Liz Plata MD sent at 3/21/2019  1:20 PM CDT -----  KRYSTEN AND CYNTHIA:  PLEASE SEND DATE AND TIME TO [facility] TO PUT ON [facility] [place] AND [facility] AND TO [facility] TO PUT ON OB LIST  DON'T FORGET TO CALL PT AND LET HER KNOW ALSO#####          Procedure: answer Y where needed       Induction     Pitocin_____     Cytotec_x___     Balloon____     Other______         Primary____      Repeat____    BTL _____________    Cerclage _________       Indication (elective/other) Term    Date desired 19  Time desired 8 PM    Due Date 19    Cervical exam- (inductions only)   Dilation: 1   Effacement: 70   Station:-2   Consistency: soft   Position: mid      YUAN SCORE____________

## 2019-03-21 NOTE — PROGRESS NOTES
Weight discussed.  Walk 30 minutes 5 days/ week.  Labs and GBS culture today.  Message sent for indxn 4/21 PM  Labor precautions

## 2019-03-25 LAB — BACTERIA SPEC AEROBE CULT: NORMAL

## 2019-03-26 ENCOUNTER — ROUTINE PRENATAL (OUTPATIENT)
Dept: OBSTETRICS AND GYNECOLOGY | Facility: CLINIC | Age: 24
End: 2019-03-26
Payer: COMMERCIAL

## 2019-03-26 VITALS
DIASTOLIC BLOOD PRESSURE: 86 MMHG | BODY MASS INDEX: 33.53 KG/M2 | SYSTOLIC BLOOD PRESSURE: 134 MMHG | WEIGHT: 183.31 LBS

## 2019-03-26 DIAGNOSIS — Z3A.36 36 WEEKS GESTATION OF PREGNANCY: Primary | ICD-10-CM

## 2019-03-26 DIAGNOSIS — Z34.03 ENCOUNTER FOR SUPERVISION OF NORMAL FIRST PREGNANCY IN THIRD TRIMESTER: ICD-10-CM

## 2019-03-26 PROCEDURE — 99999 PR PBB SHADOW E&M-EST. PATIENT-LVL II: ICD-10-PCS | Mod: PBBFAC,,, | Performed by: OBSTETRICS & GYNECOLOGY

## 2019-03-26 PROCEDURE — 0502F SUBSEQUENT PRENATAL CARE: CPT | Mod: CPTII,S$GLB,, | Performed by: OBSTETRICS & GYNECOLOGY

## 2019-03-26 PROCEDURE — 0502F PR SUBSEQUENT PRENATAL CARE: ICD-10-PCS | Mod: CPTII,S$GLB,, | Performed by: OBSTETRICS & GYNECOLOGY

## 2019-03-26 PROCEDURE — 99999 PR PBB SHADOW E&M-EST. PATIENT-LVL II: CPT | Mod: PBBFAC,,, | Performed by: OBSTETRICS & GYNECOLOGY

## 2019-04-02 ENCOUNTER — PATIENT MESSAGE (OUTPATIENT)
Dept: ADMINISTRATIVE | Facility: OTHER | Age: 24
End: 2019-04-02

## 2019-04-03 ENCOUNTER — ROUTINE PRENATAL (OUTPATIENT)
Dept: OBSTETRICS AND GYNECOLOGY | Facility: CLINIC | Age: 24
End: 2019-04-03
Payer: COMMERCIAL

## 2019-04-03 VITALS
SYSTOLIC BLOOD PRESSURE: 114 MMHG | DIASTOLIC BLOOD PRESSURE: 78 MMHG | WEIGHT: 184.06 LBS | BODY MASS INDEX: 33.67 KG/M2

## 2019-04-03 DIAGNOSIS — Z3A.37 37 WEEKS GESTATION OF PREGNANCY: Primary | ICD-10-CM

## 2019-04-03 DIAGNOSIS — Z34.03 ENCOUNTER FOR SUPERVISION OF NORMAL FIRST PREGNANCY IN THIRD TRIMESTER: ICD-10-CM

## 2019-04-03 DIAGNOSIS — R82.998 LEUKOCYTES IN URINE: ICD-10-CM

## 2019-04-03 PROCEDURE — 0502F SUBSEQUENT PRENATAL CARE: CPT | Mod: CPTII,S$GLB,, | Performed by: OBSTETRICS & GYNECOLOGY

## 2019-04-03 PROCEDURE — 99999 PR PBB SHADOW E&M-EST. PATIENT-LVL III: ICD-10-PCS | Mod: PBBFAC,,, | Performed by: OBSTETRICS & GYNECOLOGY

## 2019-04-03 PROCEDURE — 0502F PR SUBSEQUENT PRENATAL CARE: ICD-10-PCS | Mod: CPTII,S$GLB,, | Performed by: OBSTETRICS & GYNECOLOGY

## 2019-04-03 PROCEDURE — 87086 URINE CULTURE/COLONY COUNT: CPT

## 2019-04-03 PROCEDURE — 99999 PR PBB SHADOW E&M-EST. PATIENT-LVL III: CPT | Mod: PBBFAC,,, | Performed by: OBSTETRICS & GYNECOLOGY

## 2019-04-04 ENCOUNTER — TELEPHONE (OUTPATIENT)
Dept: OBSTETRICS AND GYNECOLOGY | Facility: CLINIC | Age: 24
End: 2019-04-04

## 2019-04-04 NOTE — TELEPHONE ENCOUNTER
Left voicemail to inform pt that Dr. Plata created the letter for her and sent it to her on the protal so she can print it out at her convenience.

## 2019-04-04 NOTE — TELEPHONE ENCOUNTER
Pt requesting note to excuse her from jury duty on 04/08/19. Pt is going to look up the fax number for it to be sent it, will have it available when we call her back.

## 2019-04-04 NOTE — LETTER
[facility] [address] [address]  Phone: [phone]  Fax: [phone] April 4, 2019    Makenna Mcintosh  [address] [place] [id number]      To Whom It May Concern:    Makenna Mcintosh is unable to participate in jury duty on 4/8/2019 due to her current pregnancy and her expected delivery date is 4/21/17.  If you have any questions or concerns, please feel free to call my office.        Sincerely,            Liz Plata MD

## 2019-04-05 ENCOUNTER — PATIENT MESSAGE (OUTPATIENT)
Dept: OBSTETRICS AND GYNECOLOGY | Facility: CLINIC | Age: 24
End: 2019-04-05

## 2019-04-05 LAB
BACTERIA UR CULT: NORMAL
BACTERIA UR CULT: NORMAL

## 2019-04-08 ENCOUNTER — TELEPHONE (OUTPATIENT)
Dept: OBSTETRICS AND GYNECOLOGY | Facility: CLINIC | Age: 24
End: 2019-04-08

## 2019-04-08 ENCOUNTER — HOSPITAL ENCOUNTER (EMERGENCY)
Facility: OTHER | Age: 24
Discharge: HOME | End: 2019-04-08
Attending: OBSTETRICS & GYNECOLOGY
Payer: COMMERCIAL

## 2019-04-08 VITALS
OXYGEN SATURATION: 98 % | RESPIRATION RATE: 18 BRPM | TEMPERATURE: 99 F | SYSTOLIC BLOOD PRESSURE: 130 MMHG | DIASTOLIC BLOOD PRESSURE: 80 MMHG | HEART RATE: 99 BPM

## 2019-04-08 DIAGNOSIS — Z3A.38 38 WEEKS GESTATION OF PREGNANCY: Primary | ICD-10-CM

## 2019-04-08 DIAGNOSIS — O47.9 UTERINE CONTRACTIONS DURING PREGNANCY: ICD-10-CM

## 2019-04-08 PROCEDURE — 59025 PR FETAL 2N-STRESS TEST: ICD-10-PCS | Mod: 26,,, | Performed by: OBSTETRICS & GYNECOLOGY

## 2019-04-08 PROCEDURE — 99284 EMERGENCY DEPT VISIT MOD MDM: CPT

## 2019-04-08 PROCEDURE — 99284 EMERGENCY DEPT VISIT MOD MDM: CPT | Mod: 25,,, | Performed by: OBSTETRICS & GYNECOLOGY

## 2019-04-08 PROCEDURE — 59025 FETAL NON-STRESS TEST: CPT | Mod: 26,,, | Performed by: OBSTETRICS & GYNECOLOGY

## 2019-04-08 PROCEDURE — 99284 PR EMERGENCY DEPT VISIT,LEVEL IV: ICD-10-PCS | Mod: 25,,, | Performed by: OBSTETRICS & GYNECOLOGY

## 2019-04-08 NOTE — DISCHARGE INSTRUCTIONS
Call clinic [phone] or L & D after hours at [phone] for vaginal bleeding, leakage of fluids, regular contractions every 5 mins for 2 hours, decreased fetal movements ( 10 kicks in 2 hours), headache not relieved by Tylenol, blurry vision, or temp of 100.4 or greater.  Begin doing fetal kick counts, at least 10 movements in 2 hours starting at 28 weeks gestation.  Keep next clinic appointment.

## 2019-04-08 NOTE — ED TRIAGE NOTES
Pt presents to [facility] c/o painful ctx that occur Q 4-7 minutes.  Pt also states that she has not felt her baby move today.  Pt denies LOF but does report some bloody show.  Dr. Sánchez notified of pt's arrival.

## 2019-04-08 NOTE — TELEPHONE ENCOUNTER
Dr Plata pt Ob 38wks lost mucus plug Saturday and has been cramping and lots of pressure. Pt wants to discuss want she is feeling.Pt #    [phone]

## 2019-04-08 NOTE — TELEPHONE ENCOUNTER
36 c/o continuous cramping since 10 and stomach is tight.  It doesn't come and go like ctx but does have intermittent back pain every couple minutes.  She lost mucous plus Saturday. Reports adequate hydration.  Recommended she time back pain since she could be having back labor.  Advised she probably needs to go to the [facility] to be assessed as it sounds like she could be in labor.

## 2019-04-10 ENCOUNTER — ROUTINE PRENATAL (OUTPATIENT)
Dept: OBSTETRICS AND GYNECOLOGY | Facility: CLINIC | Age: 24
End: 2019-04-10
Payer: COMMERCIAL

## 2019-04-10 VITALS
BODY MASS INDEX: 33.87 KG/M2 | SYSTOLIC BLOOD PRESSURE: 118 MMHG | DIASTOLIC BLOOD PRESSURE: 70 MMHG | WEIGHT: 185.19 LBS

## 2019-04-10 DIAGNOSIS — Z3A.38 38 WEEKS GESTATION OF PREGNANCY: Primary | ICD-10-CM

## 2019-04-10 DIAGNOSIS — Z34.03 ENCOUNTER FOR SUPERVISION OF NORMAL FIRST PREGNANCY IN THIRD TRIMESTER: ICD-10-CM

## 2019-04-10 PROCEDURE — 99999 PR PBB SHADOW E&M-EST. PATIENT-LVL III: CPT | Mod: PBBFAC,,, | Performed by: NURSE PRACTITIONER

## 2019-04-10 PROCEDURE — 0502F PR SUBSEQUENT PRENATAL CARE: ICD-10-PCS | Mod: CPTII,S$GLB,, | Performed by: NURSE PRACTITIONER

## 2019-04-10 PROCEDURE — 99999 PR PBB SHADOW E&M-EST. PATIENT-LVL III: ICD-10-PCS | Mod: PBBFAC,,, | Performed by: NURSE PRACTITIONER

## 2019-04-10 PROCEDURE — 0502F SUBSEQUENT PRENATAL CARE: CPT | Mod: CPTII,S$GLB,, | Performed by: NURSE PRACTITIONER

## 2019-04-11 NOTE — ED PROVIDER NOTES
Encounter Date: 2019       History     Chief Complaint   Patient presents with    Contractions    Decreased Fetal Movement     Makenna Mcintosh is a 23 y.o. [id number] at 38w3d presents complaining of contractions.   This IUP is uncomplicated.  Patient reports contractions, denies vaginal bleeding, denies LOF.   Fetal Movement: normal.        Review of patient's allergies indicates:   Allergen Reactions    Bactrim [sulfamethoxazole-trimethoprim] Hives     Past Medical History:   Diagnosis Date    Anxiety     Femoral hernia of left side     Insomnia     Ovarian torsion      Past Surgical History:   Procedure Laterality Date    ADENOIDECTOMY      PELVIC LAPAROSCOPY  2016    Right Ovarian Teratoma  ([facility])    WISDOM TOOTH EXTRACTION       Family History   Problem Relation Age of Onset    Diabetes Mother     Hypertension Mother     Diabetes Father     Hypertension Father     Lung cancer Maternal Grandmother     Breast cancer Neg Hx     Colon cancer Neg Hx     Ovarian cancer Neg Hx     Cancer Neg Hx      Social History     Tobacco Use    Smoking status: Never Smoker    Smokeless tobacco: Never Used   Substance Use Topics    Alcohol use: Yes     Comment: Socially    Drug use: No     Review of Systems   Constitutional: Negative for chills, fatigue and fever.   HENT: Negative for congestion.    Eyes: Negative for visual disturbance.   Respiratory: Negative for cough and shortness of breath.    Cardiovascular: Negative for chest pain and palpitations.   Gastrointestinal: Positive for abdominal pain. Negative for abdominal distention, constipation, diarrhea, nausea and vomiting.   Genitourinary: Positive for pelvic pain. Negative for difficulty urinating, dysuria, hematuria, vaginal bleeding and vaginal discharge.   Musculoskeletal: Positive for back pain.   Skin: Negative for rash.   Neurological: Negative for dizziness, seizures, light-headedness and headaches.   Hematological: Does not  bruise/bleed easily.   Psychiatric/Behavioral: Negative for dysphoric mood. The patient is not nervous/anxious.        Physical Exam     Initial Vitals   BP Pulse Resp Temp SpO2   04/08/19 1504 04/08/19 1504 04/08/19 1504 04/08/19 1503 04/08/19 1504   130/80 107 18 98.6 °F (37 °C) 96 %      MAP       --                Physical Exam    Vitals reviewed.  Constitutional: She appears well-developed and well-nourished. No distress.   HENT:   Head: Normocephalic and atraumatic.   Cardiovascular: Normal rate and regular rhythm.   Pulmonary/Chest: No respiratory distress.   Abdominal: Soft. She exhibits no distension. There is no tenderness. There is no rebound and no guarding.   Musculoskeletal: Normal range of motion. She exhibits no edema or tenderness.   Neurological: She is alert and oriented to person, place, and time.   Skin: Skin is warm and dry.   Psychiatric: She has a normal mood and affect.     OB LABOR EXAM:       Method: Sterile vaginal exam per MD.       Dilatation: 2.   Station: -3.   Effacement: 50%.             ED Course   Obtain Fetal nonstress test (NST)  Date/Time: 4/8/2019 2:08 PM  Performed by: Emmy áSnchez MD  Authorized by: Emmy Sánchez MD     Nonstress Test:     Variability:  6-25 BPM    Decelerations:  None    Accelerations:  15 bpm    Baseline:  135    Uterine Irritability: No      Contractions:  Not present  Biophysical Profile:     Nonstress Test Interpretation: reactive      Overall Impression:  Reassuring  Post-procedure:      Cervix same check from clinic, re-examined after 1 hour without change      Labs Reviewed - No data to display       Imaging Results    None                       Attending Attestation:   Physician Attestation Statement for Resident:  As the supervising MD   Physician Attestation Statement: I have personally seen and examined this patient.   I agree with the above history. -:   As the supervising MD I agree with the above PE.    As the supervising MD  I agree with the above treatment, course, plan, and disposition.   -: Patient evaluated and found to be stable, agree with resident's assessment and plan.  I was personally present during the critical portions of the procedure(s) performed by the resident and was immediately available in the ED to provide services and assistance as needed during the entire procedure.  I have reviewed the following: old records at this facility.                    ED Course as of Apr 11 1358   Mon Apr 08, 2019   1518 38 weeks, G1 with contractions. SVE 2 cm, NST reactive/reassuring, repeat exam 2 hours      [AR]   1638 Repeat SVE unchanged at 2/60/-2. Labor precautions given.     [AR]      ED Course User Index  [AR] Milli Angel MD     Clinical Impression:       ICD-10-CM ICD-9-CM   1. 38 weeks gestation of pregnancy Z3A.38 V22.2   2. Uterine contractions during pregnancy O62.2 661.20         Disposition:   Disposition: Discharged  Condition: Stable                        Emmy Sánchez MD  Resident  04/11/19 1411       Milli Angel MD  04/11/19 5044

## 2019-04-16 ENCOUNTER — PATIENT MESSAGE (OUTPATIENT)
Dept: INTERNAL MEDICINE | Facility: CLINIC | Age: 24
End: 2019-04-16

## 2019-04-17 ENCOUNTER — ROUTINE PRENATAL (OUTPATIENT)
Dept: OBSTETRICS AND GYNECOLOGY | Facility: CLINIC | Age: 24
End: 2019-04-17
Payer: COMMERCIAL

## 2019-04-17 VITALS
DIASTOLIC BLOOD PRESSURE: 76 MMHG | SYSTOLIC BLOOD PRESSURE: 118 MMHG | WEIGHT: 183.88 LBS | BODY MASS INDEX: 33.63 KG/M2

## 2019-04-17 DIAGNOSIS — Z3A.39 39 WEEKS GESTATION OF PREGNANCY: Primary | ICD-10-CM

## 2019-04-17 DIAGNOSIS — R82.998 LEUKOCYTES IN URINE: ICD-10-CM

## 2019-04-17 DIAGNOSIS — Z34.03 ENCOUNTER FOR SUPERVISION OF NORMAL FIRST PREGNANCY IN THIRD TRIMESTER: ICD-10-CM

## 2019-04-17 DIAGNOSIS — N30.90 CYSTITIS: ICD-10-CM

## 2019-04-17 PROCEDURE — 99999 PR PBB SHADOW E&M-EST. PATIENT-LVL III: ICD-10-PCS | Mod: PBBFAC,,, | Performed by: OBSTETRICS & GYNECOLOGY

## 2019-04-17 PROCEDURE — 99999 PR PBB SHADOW E&M-EST. PATIENT-LVL III: CPT | Mod: PBBFAC,,, | Performed by: OBSTETRICS & GYNECOLOGY

## 2019-04-17 PROCEDURE — 0502F PR SUBSEQUENT PRENATAL CARE: ICD-10-PCS | Mod: CPTII,S$GLB,, | Performed by: OBSTETRICS & GYNECOLOGY

## 2019-04-17 PROCEDURE — 87086 URINE CULTURE/COLONY COUNT: CPT

## 2019-04-17 PROCEDURE — 0502F SUBSEQUENT PRENATAL CARE: CPT | Mod: CPTII,S$GLB,, | Performed by: OBSTETRICS & GYNECOLOGY

## 2019-04-17 RX ORDER — NITROFURANTOIN 25; 75 MG/1; MG/1
100 CAPSULE ORAL 2 TIMES DAILY
Qty: 14 CAPSULE | Refills: 0 | Status: ON HOLD | OUTPATIENT
Start: 2019-04-17 | End: 2019-04-21 | Stop reason: HOSPADM

## 2019-04-19 LAB
BACTERIA UR CULT: NORMAL
BACTERIA UR CULT: NORMAL

## 2019-04-20 ENCOUNTER — ANESTHESIA (OUTPATIENT)
Dept: OBSTETRICS AND GYNECOLOGY | Facility: OTHER | Age: 24
End: 2019-04-20
Payer: COMMERCIAL

## 2019-04-20 ENCOUNTER — ANESTHESIA EVENT (OUTPATIENT)
Dept: OBSTETRICS AND GYNECOLOGY | Facility: OTHER | Age: 24
End: 2019-04-20
Payer: COMMERCIAL

## 2019-04-20 ENCOUNTER — HOSPITAL ENCOUNTER (INPATIENT)
Facility: OTHER | Age: 24
LOS: 2 days | Discharge: HOME | End: 2019-04-22
Attending: OBSTETRICS & GYNECOLOGY | Admitting: OBSTETRICS & GYNECOLOGY
Payer: COMMERCIAL

## 2019-04-20 DIAGNOSIS — Z3A.39 39 WEEKS GESTATION OF PREGNANCY: ICD-10-CM

## 2019-04-20 DIAGNOSIS — Z34.90 TERM PREGNANCY: ICD-10-CM

## 2019-04-20 DIAGNOSIS — Z37.9 NORMAL LABOR: ICD-10-CM

## 2019-04-20 LAB
ABO + RH BLD: NORMAL
BASOPHILS # BLD AUTO: 0.01 K/UL (ref 0–0.2)
BASOPHILS NFR BLD: 0.1 % (ref 0–1.9)
BLD GP AB SCN CELLS X3 SERPL QL: NORMAL
DIFFERENTIAL METHOD: ABNORMAL
EOSINOPHIL # BLD AUTO: 0.1 K/UL (ref 0–0.5)
EOSINOPHIL NFR BLD: 0.9 % (ref 0–8)
ERYTHROCYTE [DISTWIDTH] IN BLOOD BY AUTOMATED COUNT: 15.8 % (ref 11.5–14.5)
HCT VFR BLD AUTO: 31.4 % (ref 37–48.5)
HGB BLD-MCNC: 10.7 G/DL (ref 12–16)
LYMPHOCYTES # BLD AUTO: 1.5 K/UL (ref 1–4.8)
LYMPHOCYTES NFR BLD: 12.2 % (ref 18–48)
MCH RBC QN AUTO: 27 PG (ref 27–31)
MCHC RBC AUTO-ENTMCNC: 34.1 G/DL (ref 32–36)
MCV RBC AUTO: 79 FL (ref 82–98)
MONOCYTES # BLD AUTO: 1 K/UL (ref 0.3–1)
MONOCYTES NFR BLD: 8.2 % (ref 4–15)
NEUTROPHILS # BLD AUTO: 9.4 K/UL (ref 1.8–7.7)
NEUTROPHILS NFR BLD: 77.9 % (ref 38–73)
PLATELET # BLD AUTO: 247 K/UL (ref 150–350)
PMV BLD AUTO: 10.9 FL (ref 9.2–12.9)
RBC # BLD AUTO: 3.96 M/UL (ref 4–5.4)
WBC # BLD AUTO: 12.04 K/UL (ref 3.9–12.7)

## 2019-04-20 PROCEDURE — 62326 NJX INTERLAMINAR LMBR/SAC: CPT | Performed by: ANESTHESIOLOGY

## 2019-04-20 PROCEDURE — 96374 THER/PROPH/DIAG INJ IV PUSH: CPT

## 2019-04-20 PROCEDURE — 25000003 PHARM REV CODE 250: Performed by: OBSTETRICS & GYNECOLOGY

## 2019-04-20 PROCEDURE — 59025 FETAL NON-STRESS TEST: CPT

## 2019-04-20 PROCEDURE — 86592 SYPHILIS TEST NON-TREP QUAL: CPT

## 2019-04-20 PROCEDURE — 27800517 HC TRAY,EPIDURAL-CONTINUOUS: Performed by: STUDENT IN AN ORGANIZED HEALTH CARE EDUCATION/TRAINING PROGRAM

## 2019-04-20 PROCEDURE — 72100003 HC LABOR CARE, EA. ADDL. 8 HRS

## 2019-04-20 PROCEDURE — 96375 TX/PRO/DX INJ NEW DRUG ADDON: CPT

## 2019-04-20 PROCEDURE — 59400 PR FULL ROUT OBSTE CARE,VAGINAL DELIV: ICD-10-PCS | Mod: GB,,, | Performed by: OBSTETRICS & GYNECOLOGY

## 2019-04-20 PROCEDURE — 36415 COLL VENOUS BLD VENIPUNCTURE: CPT

## 2019-04-20 PROCEDURE — 63600175 PHARM REV CODE 636 W HCPCS: Performed by: OBSTETRICS & GYNECOLOGY

## 2019-04-20 PROCEDURE — 86901 BLOOD TYPING SEROLOGIC RH(D): CPT

## 2019-04-20 PROCEDURE — 72100002 HC LABOR CARE, 1ST 8 HOURS

## 2019-04-20 PROCEDURE — 11000001 HC ACUTE MED/SURG PRIVATE ROOM

## 2019-04-20 PROCEDURE — 0502F SUBSEQUENT PRENATAL CARE: CPT | Mod: ,,, | Performed by: OBSTETRICS & GYNECOLOGY

## 2019-04-20 PROCEDURE — 59409 OBSTETRICAL CARE: CPT | Mod: QY,,, | Performed by: ANESTHESIOLOGY

## 2019-04-20 PROCEDURE — 59025 PR FETAL 2N-STRESS TEST: ICD-10-PCS | Mod: 26,,, | Performed by: OBSTETRICS & GYNECOLOGY

## 2019-04-20 PROCEDURE — 85025 COMPLETE CBC W/AUTO DIFF WBC: CPT

## 2019-04-20 PROCEDURE — 63600175 PHARM REV CODE 636 W HCPCS: Performed by: STUDENT IN AN ORGANIZED HEALTH CARE EDUCATION/TRAINING PROGRAM

## 2019-04-20 PROCEDURE — 51702 INSERT TEMP BLADDER CATH: CPT

## 2019-04-20 PROCEDURE — 72200005 HC VAGINAL DELIVERY LEVEL II

## 2019-04-20 PROCEDURE — 59409 PRA ETRICAL CARE,VAG DELIV ONLY: ICD-10-PCS | Mod: QY,,, | Performed by: ANESTHESIOLOGY

## 2019-04-20 PROCEDURE — 99285 EMERGENCY DEPT VISIT HI MDM: CPT | Mod: 25,,, | Performed by: OBSTETRICS & GYNECOLOGY

## 2019-04-20 PROCEDURE — 59025 FETAL NON-STRESS TEST: CPT | Mod: 26,,, | Performed by: OBSTETRICS & GYNECOLOGY

## 2019-04-20 PROCEDURE — 0502F PR SUBSEQUENT PRENATAL CARE: ICD-10-PCS | Mod: ,,, | Performed by: OBSTETRICS & GYNECOLOGY

## 2019-04-20 PROCEDURE — 25000003 PHARM REV CODE 250: Performed by: STUDENT IN AN ORGANIZED HEALTH CARE EDUCATION/TRAINING PROGRAM

## 2019-04-20 PROCEDURE — 59400 OBSTETRICAL CARE: CPT | Mod: GB,,, | Performed by: OBSTETRICS & GYNECOLOGY

## 2019-04-20 PROCEDURE — 99285 PR EMERGENCY DEPT VISIT,LEVEL V: ICD-10-PCS | Mod: 25,,, | Performed by: OBSTETRICS & GYNECOLOGY

## 2019-04-20 PROCEDURE — 99285 EMERGENCY DEPT VISIT HI MDM: CPT | Mod: 25

## 2019-04-20 PROCEDURE — 27200710 HC EPIDURAL INFUSION PUMP SET: Performed by: STUDENT IN AN ORGANIZED HEALTH CARE EDUCATION/TRAINING PROGRAM

## 2019-04-20 RX ORDER — OXYTOCIN/RINGER'S LACTATE 20/1000 ML
333 PLASTIC BAG, INJECTION (ML) INTRAVENOUS CONTINUOUS
Status: DISCONTINUED | OUTPATIENT
Start: 2019-04-20 | End: 2019-04-20

## 2019-04-20 RX ORDER — SODIUM CHLORIDE 9 MG/ML
INJECTION, SOLUTION INTRAVENOUS
Status: DISCONTINUED | OUTPATIENT
Start: 2019-04-20 | End: 2019-04-20

## 2019-04-20 RX ORDER — SODIUM CHLORIDE, SODIUM LACTATE, POTASSIUM CHLORIDE, CALCIUM CHLORIDE 600; 310; 30; 20 MG/100ML; MG/100ML; MG/100ML; MG/100ML
INJECTION, SOLUTION INTRAVENOUS CONTINUOUS
Status: DISCONTINUED | OUTPATIENT
Start: 2019-04-20 | End: 2019-04-20

## 2019-04-20 RX ORDER — OXYCODONE AND ACETAMINOPHEN 10; 325 MG/1; MG/1
1 TABLET ORAL EVERY 4 HOURS PRN
Status: DISCONTINUED | OUTPATIENT
Start: 2019-04-20 | End: 2019-04-20

## 2019-04-20 RX ORDER — DIPHENHYDRAMINE HYDROCHLORIDE 50 MG/ML
25 INJECTION INTRAMUSCULAR; INTRAVENOUS EVERY 4 HOURS PRN
Status: DISCONTINUED | OUTPATIENT
Start: 2019-04-20 | End: 2019-04-22 | Stop reason: HOSPADM

## 2019-04-20 RX ORDER — SODIUM CITRATE AND CITRIC ACID MONOHYDRATE 334; 500 MG/5ML; MG/5ML
30 SOLUTION ORAL ONCE
Status: DISCONTINUED | OUTPATIENT
Start: 2019-04-20 | End: 2019-04-20

## 2019-04-20 RX ORDER — FENTANYL/BUPIVACAINE/NS/PF 2MCG/ML-.1
PLASTIC BAG, INJECTION (ML) INJECTION CONTINUOUS PRN
Status: DISCONTINUED | OUTPATIENT
Start: 2019-04-20 | End: 2019-04-20

## 2019-04-20 RX ORDER — OXYCODONE AND ACETAMINOPHEN 5; 325 MG/1; MG/1
1 TABLET ORAL EVERY 4 HOURS PRN
Status: DISCONTINUED | OUTPATIENT
Start: 2019-04-20 | End: 2019-04-20

## 2019-04-20 RX ORDER — ACETAMINOPHEN 325 MG/1
650 TABLET ORAL EVERY 6 HOURS PRN
Status: DISCONTINUED | OUTPATIENT
Start: 2019-04-20 | End: 2019-04-22 | Stop reason: HOSPADM

## 2019-04-20 RX ORDER — MISOPROSTOL 200 UG/1
600 TABLET ORAL
Status: DISCONTINUED | OUTPATIENT
Start: 2019-04-20 | End: 2019-04-20

## 2019-04-20 RX ORDER — IBUPROFEN 400 MG/1
800 TABLET ORAL EVERY 8 HOURS PRN
Status: DISCONTINUED | OUTPATIENT
Start: 2019-04-20 | End: 2019-04-20

## 2019-04-20 RX ORDER — ONDANSETRON 8 MG/1
8 TABLET, ORALLY DISINTEGRATING ORAL EVERY 8 HOURS PRN
Status: DISCONTINUED | OUTPATIENT
Start: 2019-04-20 | End: 2019-04-20

## 2019-04-20 RX ORDER — DOCUSATE SODIUM 100 MG/1
200 CAPSULE, LIQUID FILLED ORAL 2 TIMES DAILY PRN
Status: DISCONTINUED | OUTPATIENT
Start: 2019-04-20 | End: 2019-04-22 | Stop reason: HOSPADM

## 2019-04-20 RX ORDER — OXYTOCIN/RINGER'S LACTATE 20/1000 ML
41.7 PLASTIC BAG, INJECTION (ML) INTRAVENOUS CONTINUOUS
Status: DISCONTINUED | OUTPATIENT
Start: 2019-04-20 | End: 2019-04-20

## 2019-04-20 RX ORDER — BUTORPHANOL TARTRATE 2 MG/ML
2 INJECTION INTRAMUSCULAR; INTRAVENOUS ONCE
Status: COMPLETED | OUTPATIENT
Start: 2019-04-20 | End: 2019-04-20

## 2019-04-20 RX ORDER — FENTANYL CITRATE 50 UG/ML
INJECTION, SOLUTION INTRAMUSCULAR; INTRAVENOUS
Status: DISCONTINUED | OUTPATIENT
Start: 2019-04-20 | End: 2019-04-20

## 2019-04-20 RX ORDER — BUTORPHANOL TARTRATE 2 MG/ML
1 INJECTION INTRAMUSCULAR; INTRAVENOUS
Status: DISCONTINUED | OUTPATIENT
Start: 2019-04-20 | End: 2019-04-20

## 2019-04-20 RX ORDER — BUTORPHANOL TARTRATE 2 MG/ML
2 INJECTION INTRAMUSCULAR; INTRAVENOUS
Status: DISCONTINUED | OUTPATIENT
Start: 2019-04-20 | End: 2019-04-22 | Stop reason: HOSPADM

## 2019-04-20 RX ORDER — IBUPROFEN 600 MG/1
600 TABLET ORAL EVERY 6 HOURS PRN
Status: DISCONTINUED | OUTPATIENT
Start: 2019-04-20 | End: 2019-04-22 | Stop reason: HOSPADM

## 2019-04-20 RX ORDER — FENTANYL/BUPIVACAINE/NS/PF 2MCG/ML-.1
PLASTIC BAG, INJECTION (ML) INJECTION
Status: DISPENSED
Start: 2019-04-20 | End: 2019-04-20

## 2019-04-20 RX ORDER — SODIUM CHLORIDE 9 MG/ML
INJECTION, SOLUTION INTRAVENOUS
Status: DISCONTINUED | OUTPATIENT
Start: 2019-04-20 | End: 2019-04-22 | Stop reason: HOSPADM

## 2019-04-20 RX ORDER — ONDANSETRON 8 MG/1
8 TABLET, ORALLY DISINTEGRATING ORAL EVERY 8 HOURS PRN
Status: DISCONTINUED | OUTPATIENT
Start: 2019-04-20 | End: 2019-04-22 | Stop reason: HOSPADM

## 2019-04-20 RX ORDER — HYDROCORTISONE 25 MG/G
CREAM TOPICAL 3 TIMES DAILY PRN
Status: DISCONTINUED | OUTPATIENT
Start: 2019-04-20 | End: 2019-04-22 | Stop reason: HOSPADM

## 2019-04-20 RX ORDER — MISOPROSTOL 200 UG/1
600 TABLET ORAL
Status: DISCONTINUED | OUTPATIENT
Start: 2019-04-20 | End: 2019-04-22 | Stop reason: HOSPADM

## 2019-04-20 RX ORDER — FAMOTIDINE 10 MG/ML
20 INJECTION INTRAVENOUS ONCE
Status: DISCONTINUED | OUTPATIENT
Start: 2019-04-20 | End: 2019-04-20

## 2019-04-20 RX ORDER — DIPHENHYDRAMINE HCL 25 MG
25 CAPSULE ORAL EVERY 4 HOURS PRN
Status: DISCONTINUED | OUTPATIENT
Start: 2019-04-20 | End: 2019-04-22 | Stop reason: HOSPADM

## 2019-04-20 RX ORDER — OXYCODONE AND ACETAMINOPHEN 5; 325 MG/1; MG/1
1 TABLET ORAL EVERY 4 HOURS PRN
Status: DISCONTINUED | OUTPATIENT
Start: 2019-04-20 | End: 2019-04-22 | Stop reason: HOSPADM

## 2019-04-20 RX ORDER — OXYTOCIN/RINGER'S LACTATE 20/1000 ML
41.65 PLASTIC BAG, INJECTION (ML) INTRAVENOUS CONTINUOUS
Status: ACTIVE | OUTPATIENT
Start: 2019-04-20 | End: 2019-04-20

## 2019-04-20 RX ORDER — CEFAZOLIN SODIUM 2 G/50ML
2 SOLUTION INTRAVENOUS
Status: DISCONTINUED | OUTPATIENT
Start: 2019-04-20 | End: 2019-04-20

## 2019-04-20 RX ORDER — FENTANYL/BUPIVACAINE/NS/PF 2MCG/ML-.1
PLASTIC BAG, INJECTION (ML) INJECTION CONTINUOUS
Status: DISCONTINUED | OUTPATIENT
Start: 2019-04-20 | End: 2019-04-20

## 2019-04-20 RX ORDER — OXYTOCIN/RINGER'S LACTATE 20/1000 ML
10 PLASTIC BAG, INJECTION (ML) INTRAVENOUS ONCE
Status: DISCONTINUED | OUTPATIENT
Start: 2019-04-20 | End: 2019-04-20

## 2019-04-20 RX ORDER — OXYCODONE AND ACETAMINOPHEN 10; 325 MG/1; MG/1
1 TABLET ORAL EVERY 4 HOURS PRN
Status: DISCONTINUED | OUTPATIENT
Start: 2019-04-20 | End: 2019-04-22 | Stop reason: HOSPADM

## 2019-04-20 RX ORDER — OXYTOCIN/RINGER'S LACTATE 20/1000 ML
2 PLASTIC BAG, INJECTION (ML) INTRAVENOUS CONTINUOUS
Status: DISCONTINUED | OUTPATIENT
Start: 2019-04-20 | End: 2019-04-20

## 2019-04-20 RX ADMIN — PROMETHAZINE HYDROCHLORIDE 25 MG: 25 INJECTION INTRAMUSCULAR; INTRAVENOUS at 02:04

## 2019-04-20 RX ADMIN — BUTORPHANOL TARTRATE 2 MG: 2 INJECTION, SOLUTION INTRAMUSCULAR; INTRAVENOUS at 02:04

## 2019-04-20 RX ADMIN — Medication 41.65 MILLI-UNITS/MIN: at 03:04

## 2019-04-20 RX ADMIN — FENTANYL CITRATE 100 MCG: 50 INJECTION, SOLUTION INTRAMUSCULAR; INTRAVENOUS at 12:04

## 2019-04-20 RX ADMIN — Medication 10 ML/HR: at 04:04

## 2019-04-20 RX ADMIN — SODIUM CHLORIDE, SODIUM LACTATE, POTASSIUM CHLORIDE, AND CALCIUM CHLORIDE: .6; .31; .03; .02 INJECTION, SOLUTION INTRAVENOUS at 02:04

## 2019-04-20 RX ADMIN — SODIUM CHLORIDE, SODIUM LACTATE, POTASSIUM CHLORIDE, AND CALCIUM CHLORIDE: .6; .31; .03; .02 INJECTION, SOLUTION INTRAVENOUS at 09:04

## 2019-04-20 RX ADMIN — SODIUM CHLORIDE, SODIUM LACTATE, POTASSIUM CHLORIDE, AND CALCIUM CHLORIDE: .6; .31; .03; .02 INJECTION, SOLUTION INTRAVENOUS at 04:04

## 2019-04-20 RX ADMIN — Medication 2 MILLI-UNITS/MIN: at 09:04

## 2019-04-20 NOTE — ANESTHESIA PREPROCEDURE EVALUATION
Makenna Mcintosh is a 23 y.o. female  at 39w3d who presents in active labor from [facility]. This pregnancy has been uncomplicated.     OB History    Para Term  AB Living   1 0 0 0 0 0   SAB TAB Ectopic Multiple Live Births   0 0 0 0        # Outcome Date GA Lbr Holger/2nd Weight Sex Delivery Anes PTL Lv   1 Current                Wt Readings from Last 1 Encounters:   19 1227 83 kg (182 lb 15.7 oz)       BP Readings from Last 3 Encounters:   19 136/74   19 112/69   19 118/76       Patient Active Problem List   Diagnosis    Anxiety    Femoral hernia of left side    Normal labor       Past Surgical History:   Procedure Laterality Date    ADENOIDECTOMY      PELVIC LAPAROSCOPY  2016    Right Ovarian Teratoma  ([facility])    WISDOM TOOTH EXTRACTION         Social History     Socioeconomic History    Marital status:      Spouse name: Not on file    Number of children: Not on file    Years of education: Not on file    Highest education level: Not on file   Occupational History    Occupation: opthalmology clinic scribe    Social Needs    Financial resource strain: Not on file    Food insecurity:     Worry: Not on file     Inability: Not on file    Transportation needs:     Medical: Not on file     Non-medical: Not on file   Tobacco Use    Smoking status: Never Smoker    Smokeless tobacco: Never Used   Substance and Sexual Activity    Alcohol use: Yes     Comment: Socially    Drug use: No    Sexual activity: Yes     Partners: Male     Birth control/protection: None     Comment: :     Lifestyle    Physical activity:     Days per week: Not on file     Minutes per session: Not on file    Stress: Not on file   Relationships    Social connections:     Talks on phone: Not on file     Gets together: Not on file     Attends Sikhism service: Not on file     Active member of club or organization: Not on file     Attends meetings of clubs or organizations: Not  on file     Relationship status: Not on file   Other Topics Concern    Not on file   Social History Narrative    Not on file         Chemistry        Component Value Date/Time     04/19/2019 1501    K 3.8 04/19/2019 1501     04/19/2019 1501    CO2 19 (L) 04/19/2019 1501    BUN 4 (L) 04/19/2019 1501    CREATININE 0.6 04/19/2019 1501     (H) 04/19/2019 1501        Component Value Date/Time    CALCIUM 8.5 (L) 04/19/2019 1501    ALKPHOS 117 04/19/2019 1501    AST 12 04/19/2019 1501    ALT 7 (L) 04/19/2019 1501    BILITOT 0.2 04/19/2019 1501    ESTGFRAFRICA >60 04/19/2019 1501    EGFRNONAA >60 04/19/2019 1501            Lab Results   Component Value Date    WBC 12.04 04/20/2019    HGB 10.7 (L) 04/20/2019    HCT 31.4 (L) 04/20/2019    MCV 79 (L) 04/20/2019     04/20/2019       No results for input(s): PT, INR, PROTIME, APTT in the last 72 hours.                  Anesthesia Evaluation    I have reviewed the Patient Summary Reports.    I have reviewed the Nursing Notes.   I have reviewed the Medications.     Review of Systems  Anesthesia Hx:  No problems with previous Anesthesia  History of prior surgery of interest to airway management or planning: Denies Family Hx of Anesthesia complications.    Social:  Non-Smoker, No Alcohol Use    Hematology/Oncology:  Hematology Normal   Oncology Normal     EENT/Dental:EENT/Dental Normal   Cardiovascular:  Cardiovascular Normal     Pulmonary:   Denies Asthma.    Renal/:  Renal/ Normal     Hepatic/GI:  Hepatic/GI Normal    Musculoskeletal:  Musculoskeletal Normal    Neurological:   Denies Seizures.    Endocrine:  Endocrine Normal    Psych:  Psychiatric Normal           Physical Exam  General:  Well nourished    Airway/Jaw/Neck:  Airway Findings: Mouth Opening: Normal Tongue: Normal  General Airway Assessment: Adult, Good  Mallampati: III  Improves to II with phonation.  TM Distance: Normal, at least 6 cm  Jaw/Neck Findings:  Neck ROM: Normal ROM       Dental:  Dental Findings: In tact   Chest/Lungs:  Chest/Lungs Clear    Heart/Vascular:  Heart Findings: Normal Heart murmur: negative       Mental Status:  Mental Status Findings:  Cooperative, Alert and Oriented         Anesthesia Plan  Type of Anesthesia, risks & benefits discussed:  Anesthesia Type:  CSE, epidural, general, MAC, spinal  Patient's Preference:   Intra-op Monitoring Plan: standard ASA monitors  Intra-op Monitoring Plan Comments:   Post Op Pain Control Plan: per primary service following discharge from PACU, intrathecal opioid, epidural analgesia, IV/PO Opioids PRN and multimodal analgesia  Post Op Pain Control Plan Comments:   Induction:   IV  Beta Blocker:         Informed Consent: Patient understands risks and agrees with Anesthesia plan.  Questions answered. Anesthesia consent signed with patient.  ASA Score: 2     Day of Surgery Review of History & Physical:            Ready For Surgery From Anesthesia Perspective.

## 2019-04-20 NOTE — SUBJECTIVE & OBJECTIVE
Obstetric HPI:  Patient reports Intensity: moderate contractions, active fetal movement, Yes vaginal bleeding , No loss of fluid       OB History    Para Term  AB Living   1 0 0 0 0 0   SAB TAB Ectopic Multiple Live Births   0 0 0 0 0      # Outcome Date GA Lbr Holger/2nd Weight Sex Delivery Anes PTL Lv   1 Current              Past Medical History:   Diagnosis Date    Anxiety     Femoral hernia of left side     Insomnia     Ovarian torsion      Past Surgical History:   Procedure Laterality Date    ADENOIDECTOMY      PELVIC LAPAROSCOPY  2016    Right Ovarian Teratoma  ([facility])    WISDOM TOOTH EXTRACTION           (Not in a hospital admission)    Review of patient's allergies indicates:   Allergen Reactions    Bactrim [sulfamethoxazole-trimethoprim] Hives        Family History     Problem Relation (Age of Onset)    Diabetes Mother, Father    Hypertension Mother, Father    Lung cancer Maternal Grandmother        Tobacco Use    Smoking status: Never Smoker    Smokeless tobacco: Never Used   Substance and Sexual Activity    Alcohol use: Yes     Comment: Socially    Drug use: No    Sexual activity: Yes     Partners: Male     Birth control/protection: None     Comment: :       Review of Systems   Constitutional: Negative for fatigue.   Respiratory: Negative for shortness of breath.    Gastrointestinal: Positive for abdominal pain. Negative for nausea and vomiting.   Genitourinary: Positive for vaginal bleeding. Negative for pelvic pain and vaginal discharge.   Neurological: Negative for headaches.      Objective:     Vital Signs (Most Recent):  Temp: 97.9 °F (36.6 °C) (19 014)  Pulse: 80 (19 0143)  Resp: 18 (19 014)  BP: 129/83 (19 014)  SpO2: 98 % (19) Vital Signs (24h Range):  Temp:  [97.9 °F (36.6 °C)] 97.9 °F (36.6 °C)  Pulse:  [] 80  Resp:  [18] 18  SpO2:  [92 %-100 %] 98 %  BP: (112-137)/(69-90) 129/83        There is no height or weight  on file to calculate BMI.    FHT: 140 Cat 1 (reassuring)  TOCO:  Q 2-3 minutes    Physical Exam:   Constitutional: She is oriented to person, place, and time. She appears well-developed and well-nourished.       Cardiovascular: Normal rate.     Pulmonary/Chest: Effort normal.        Abdominal: Soft. She exhibits no abdominal incision. There is no tenderness (No fundal tenderness).     Genitourinary: Vagina normal.           Musculoskeletal: She exhibits edema (1+ edema bilaterally). She exhibits no tenderness.       Neurological: She is alert and oriented to person, place, and time.     Psychiatric: She has a normal mood and affect.       Cervix:  Dilation:  4  Effacement:  75%  Station: -3  Presentation: Vertex     Significant Labs:  Lab Results   Component Value Date    GROUPTRH O POS 04/19/2019    HEPBSAG Negative 09/13/2018    STREPBCULT No Group B Streptococcus isolated 03/21/2019       I have personallly reviewed all pertinent lab results from the last 24 hours.

## 2019-04-20 NOTE — PROGRESS NOTES
NST reactive reassuring, good scalp stim response  CTX every 2-3 minutes on 14 of pitocon  SVE 8/100/-1  Continue current management  Recheck cervix in 2 hours

## 2019-04-20 NOTE — L&D DELIVERY NOTE
[facility]  Vaginal Delivery   Operative Note    SUMMARY     Normal spontaneous vaginal delivery of live infant, was placed on mothers abdomen for skin to skin and bulb suctioning performed.  Infant delivered position OA over intact perineum.  Nuchal cord: Yes, cord reduced at perineum.    Spontaneous delivery of placenta and IV pitocin given noting good uterine tone.  No lacerations noted except a small left periurethral tear repaired with one figure of eight suture of 3-0 monocryl  Patient tolerated delivery well. Sponge needle and lap counted correctly x2. Patient pushed one time, apgars 8/9.  cc    Indications: <principal problem not specified>  Pregnancy complicated by:   Patient Active Problem List   Diagnosis    Anxiety    Femoral hernia of left side    Normal labor    Term pregnancy     Admitting GA: 39w5d    This patient has no babies on file.

## 2019-04-20 NOTE — PROGRESS NOTES
Labor Progress Note        Subjective:      Patient currently doing well without complaints.     Objective:      Temp:  [97.9 °F (36.6 °C)-98.1 °F (36.7 °C)] 98.1 °F (36.7 °C)  Pulse:  [] 85  Resp:  [18] 18  SpO2:  [92 %-100 %] 96 %  BP: (112-137)/(69-90) 136/74  There is no height or weight on file to calculate BMI.     General: no acute distress  Electronic Fetal Monitoring:  FHT: 150 bpm, moderate variability, accelerations present, decelerations absent   Category: 1                 TOCO: Contractions: regular, every 2-3 minutes   Cervix:4/100/-2 AROM clear   Assessment:     1. IUP at  here for spontaneous labor     Plan:     1. Continue expectant management. Add Pitocin as needed.  2. Reassuring FHT  3. Epidural yes.   4. Membranes ruptured yes.   5. Recheck in 4 hours or prn.

## 2019-04-20 NOTE — ANESTHESIA PROCEDURE NOTES
Epidural    Patient location during procedure: OB   Reason for block: primary anesthetic   Start time: 4/20/2019 4:49 AM  Timeout: 4/20/2019 4:49 AM  End time: 4/20/2019 4:53 AM  Staffing  Anesthesiologist: Jose Cody MD  Resident/CRNA: Nasra Maravilla MD  Performed: resident/CRNA   Preanesthetic Checklist  Completed: patient identified, site marked, surgical consent, pre-op evaluation, timeout performed, IV checked, risks and benefits discussed, monitors and equipment checked, anesthesia consent given, hand hygiene performed and patient being monitored  Preparation  Patient position: sitting  Prep: ChloraPrep  Patient monitoring: Blood Pressure, Pulse Ox and ECG  Epidural  Skin Anesthetic: lidocaine 1%  Skin Wheal: 3 mL  Administration type: single shot  Approach: midline  Interspace: L3-4    Injection technique: KRYSTINA air  Needle and Epidural Catheter  Needle type: Tuohy   Needle gauge: 17  Needle length: 3.5 inches  Needle insertion depth: 7 cm  Catheter type: springwound and multi-orifice  Catheter size: 19 G  Catheter at skin depth: 11 cm  Test dose: 3 mL of lidocaine 1.5% with Epi 1-to-200,000  Additional Documentation: incremental injection, negative aspiration for heme and CSF and no paresthesia on injection  Needle localization: anatomical landmarks  Assessment  Upper dermatomal levels - Left: T9  Right: T10   Dermatomal levels determined by ice  Ease of block: easy  Patient's tolerance of the procedure: no complaints and comfortable throughout blockNo inadvertent dural puncture with Tuohy.  Dural puncture not performed with spinal needle

## 2019-04-20 NOTE — PROGRESS NOTES
No complaints  NST reassuring, +scalp stimulation response  SVE 8-9/100/-2    Position on peanut ball  Recheck 2 hours

## 2019-04-20 NOTE — PROGRESS NOTES
"Patient is without complaints. Comfortable with epidural.     /73   Pulse (!) 114   Temp 97.9 °F (36.6 °C) (Temporal)   Resp 16   Ht 5' 2" (1.575 m)   Wt 83 kg (182 lb 15.7 oz)   LMP 07/09/2018 (Exact Date)   SpO2 98%   Breastfeeding? No   BMI 33.47 kg/m²      reactive, reassuring, cat 1  CTV q 2-3 minutes on 2 of pitocin  SVE 5-6/100/-2    Increase pitocin, recheck cervix in 2-3 hours  "

## 2019-04-20 NOTE — ED PROVIDER NOTES
Encounter Date: 2019       History     Chief Complaint   Patient presents with    Contractions     24 yo  39.5 weeks presents c/o painful contractions. Patient reports good fetal movement, bloody show but no leakage of fluid.  Patient was seen in the OB ED yesterday c/o possible SROM and painful contractions, was observed and given Stadol and Phenergan for theraputic rest. She reports that after returning home contractions improved, but have since become much worse. They were 3 minutes apart at home and are now 5-7 minutes apart.   Mariam requests an additional dose on Stadol and Phenergan.        Review of patient's allergies indicates:   Allergen Reactions    Bactrim [sulfamethoxazole-trimethoprim] Hives     Past Medical History:   Diagnosis Date    Anxiety     Femoral hernia of left side     Insomnia     Ovarian torsion      Past Surgical History:   Procedure Laterality Date    ADENOIDECTOMY      PELVIC LAPAROSCOPY  2016    Right Ovarian Teratoma  ([facility])    WISDOM TOOTH EXTRACTION       Family History   Problem Relation Age of Onset    Diabetes Mother     Hypertension Mother     Diabetes Father     Hypertension Father     Lung cancer Maternal Grandmother     Breast cancer Neg Hx     Colon cancer Neg Hx     Ovarian cancer Neg Hx     Cancer Neg Hx      Social History     Tobacco Use    Smoking status: Never Smoker    Smokeless tobacco: Never Used   Substance Use Topics    Alcohol use: Yes     Comment: Socially    Drug use: No     Review of Systems   Constitutional: Negative for fever.   HENT: Negative for sore throat.    Respiratory: Negative for shortness of breath.    Cardiovascular: Negative for chest pain.   Gastrointestinal: Positive for abdominal distention (Gravid) and abdominal pain. Negative for nausea.   Genitourinary: Positive for vaginal bleeding. Negative for dysuria and vaginal discharge.   Musculoskeletal: Negative for back pain.   Skin: Negative for rash.    Neurological: Negative for weakness.   Hematological: Does not bruise/bleed easily.       Physical Exam     Initial Vitals   BP Pulse Resp Temp SpO2   -- -- -- -- --      MAP       --         Physical Exam    Nursing note and vitals reviewed.  Constitutional: She appears well-developed and well-nourished. She is not diaphoretic. No distress.   HENT:   Head: Normocephalic and atraumatic.   Eyes: Conjunctivae and EOM are normal.   Neck: Normal range of motion.   Cardiovascular: Normal rate.   Pulmonary/Chest: No respiratory distress.   Abdominal: Soft.   Genitourinary: Vagina normal.   Musculoskeletal: Normal range of motion.   Neurological: She is alert and oriented to person, place, and time.   Skin: Skin is warm and dry.   Psychiatric: She has a normal mood and affect.     OB LABOR EXAM:   Pre-Term Labor: No.   Membranes ruptured: No.   Method: Sterile vaginal exam per MD.   Vaginal Bleeding: bloody show.   Engagement: ballotable/floating.   Dilatation: 3.     Effacement: 70%.   Amniotic Fluid Color: no fluid.     Comments: NST Interpretation:   145 BPM baseline  Variability: moderate  Accelerations: present  Decelerations: absent  Contractions: q 5-7 minutes    Clinical Impression: Reactive Non-Stress Test         ED Course   Procedures  Labs Reviewed - No data to display       Imaging Results    None          Medical Decision Making:   ED Management:  22 yo  at 39.5 with persistent painful latent phase labor  -reassuring FHT  -additional dose of Stadol/Phenergan requiring intensive monitoring  -reassess after therapeutic rest, now 4 cm  -admit                      Clinical Impression:       ICD-10-CM ICD-9-CM   1. 39 weeks gestation of pregnancy Z3A.39 V22.2   2. Normal labor O80 650    Z37.9    3. Term pregnancy Z34.80 V22.1   4. Labor, prolonged latent phase O62.0 661.00                                Cynthia French MD  [id number]

## 2019-04-20 NOTE — HOSPITAL COURSE
Admitted to L+D, requests epidural. Patietn progressed to vaginal delivery without complications.  PPD 1: Patient is without complaint, nursing well.  PPD2: Makenna continues to do well without major complaints.  She is ready for d/c home

## 2019-04-20 NOTE — H&P
[facility] OB ED  Obstetrics  History & Physical    Patient Name: Makenna Mcintosh  MRN: [id number]  Admission Date: 2019  Primary Care Provider: Nereida De La Rosa MD    Subjective:     Principal Problem:<principal problem not specified>    History of Present Illness:  22 yo  39.5 weeks presents c/o painful contractions. Patient reports good fetal movement, bloody show but no leakage of fluid.  Patient was seen in the OB ED yesterday c/o possible SROM and painful contractions, was observed and given Stadol and Phenergan for theraputic rest. She reports that after returning home contractions improved, but have since become much worse. They were 3 minutes apart at home and are now 5-7 minutes apart.   Emileen requests an additional dose on Stadol and Phenergan.        Obstetric HPI:  Patient reports Intensity: moderate contractions, active fetal movement, Yes vaginal bleeding , No loss of fluid       OB History    Para Term  AB Living   1 0 0 0 0 0   SAB TAB Ectopic Multiple Live Births   0 0 0 0 0      # Outcome Date GA Lbr Holger/2nd Weight Sex Delivery Anes PTL Lv   1 Current              Past Medical History:   Diagnosis Date    Anxiety     Femoral hernia of left side     Insomnia     Ovarian torsion      Past Surgical History:   Procedure Laterality Date    ADENOIDECTOMY      PELVIC LAPAROSCOPY  2016    Right Ovarian Teratoma  ([facility])    WISDOM TOOTH EXTRACTION           (Not in a hospital admission)    Review of patient's allergies indicates:   Allergen Reactions    Bactrim [sulfamethoxazole-trimethoprim] Hives        Family History     Problem Relation (Age of Onset)    Diabetes Mother, Father    Hypertension Mother, Father    Lung cancer Maternal Grandmother        Tobacco Use    Smoking status: Never Smoker    Smokeless tobacco: Never Used   Substance and Sexual Activity    Alcohol use: Yes     Comment: Socially    Drug use: No    Sexual activity: Yes      Partners: Male     Birth control/protection: None     Comment: :       Review of Systems   Constitutional: Negative for fatigue.   Respiratory: Negative for shortness of breath.    Gastrointestinal: Positive for abdominal pain. Negative for nausea and vomiting.   Genitourinary: Positive for vaginal bleeding. Negative for pelvic pain and vaginal discharge.   Neurological: Negative for headaches.      Objective:     Vital Signs (Most Recent):  Temp: 97.9 °F (36.6 °C) (19 014)  Pulse: 80 (19 0143)  Resp: 18 (19 014)  BP: 129/83 (19 0140)  SpO2: 98 % (19 0143) Vital Signs (24h Range):  Temp:  [97.9 °F (36.6 °C)] 97.9 °F (36.6 °C)  Pulse:  [] 80  Resp:  [18] 18  SpO2:  [92 %-100 %] 98 %  BP: (112-137)/(69-90) 129/83        There is no height or weight on file to calculate BMI.    FHT: 140 Cat 1 (reassuring)  TOCO:  Q 2-3 minutes    Physical Exam:   Constitutional: She is oriented to person, place, and time. She appears well-developed and well-nourished.       Cardiovascular: Normal rate.     Pulmonary/Chest: Effort normal.        Abdominal: Soft. She exhibits no abdominal incision. There is no tenderness (No fundal tenderness).     Genitourinary: Vagina normal.           Musculoskeletal: She exhibits edema (1+ edema bilaterally). She exhibits no tenderness.       Neurological: She is alert and oriented to person, place, and time.     Psychiatric: She has a normal mood and affect.       Cervix:  Dilation:  4  Effacement:  75%  Station: -3  Presentation: Vertex     Significant Labs:  Lab Results   Component Value Date    GROUPTRH O POS 2019    HEPBSAG Negative 2018    STREPBCULT No Group B Streptococcus isolated 2019       I have personallly reviewed all pertinent lab results from the last 24 hours.    Assessment/Plan:     23 y.o. female  at 39w5d for:    Normal labor  Admit to L+D        Cynthia French MD  Obstetrics  [facility]  ED

## 2019-04-20 NOTE — HPI
22 yo  39.5 weeks presents c/o painful contractions. Patient reports good fetal movement, bloody show but no leakage of fluid.  Patient was seen in the OB ED yesterday c/o possible SROM and painful contractions, was observed and given Stadol and Phenergan for theraputic rest. She reports that after returning home contractions improved, but have since become much worse. They were 3 minutes apart at home and are now 5-7 minutes apart.   Ann-Marieshari requests an additional dose on Stadol and Phenergan.

## 2019-04-21 PROBLEM — Z3A.39 39 WEEKS GESTATION OF PREGNANCY: Status: RESOLVED | Noted: 2019-04-20 | Resolved: 2019-04-21

## 2019-04-21 LAB
BASOPHILS # BLD AUTO: 0.02 K/UL (ref 0–0.2)
BASOPHILS NFR BLD: 0.1 % (ref 0–1.9)
DIFFERENTIAL METHOD: ABNORMAL
EOSINOPHIL # BLD AUTO: 0.2 K/UL (ref 0–0.5)
EOSINOPHIL NFR BLD: 0.8 % (ref 0–8)
ERYTHROCYTE [DISTWIDTH] IN BLOOD BY AUTOMATED COUNT: 16.1 % (ref 11.5–14.5)
HCT VFR BLD AUTO: 28.1 % (ref 37–48.5)
HGB BLD-MCNC: 8.9 G/DL (ref 12–16)
LYMPHOCYTES # BLD AUTO: 2.3 K/UL (ref 1–4.8)
LYMPHOCYTES NFR BLD: 12.5 % (ref 18–48)
MCH RBC QN AUTO: 24.9 PG (ref 27–31)
MCHC RBC AUTO-ENTMCNC: 31.7 G/DL (ref 32–36)
MCV RBC AUTO: 79 FL (ref 82–98)
MONOCYTES # BLD AUTO: 1.3 K/UL (ref 0.3–1)
MONOCYTES NFR BLD: 7.2 % (ref 4–15)
NEUTROPHILS # BLD AUTO: 14.4 K/UL (ref 1.8–7.7)
NEUTROPHILS NFR BLD: 79 % (ref 38–73)
PLATELET # BLD AUTO: 252 K/UL (ref 150–350)
PMV BLD AUTO: 10.4 FL (ref 9.2–12.9)
RBC # BLD AUTO: 3.57 M/UL (ref 4–5.4)
WBC # BLD AUTO: 18.22 K/UL (ref 3.9–12.7)

## 2019-04-21 PROCEDURE — 25000003 PHARM REV CODE 250: Performed by: OBSTETRICS & GYNECOLOGY

## 2019-04-21 PROCEDURE — 99024 PR POST-OP FOLLOW-UP VISIT: ICD-10-PCS | Mod: ,,, | Performed by: OBSTETRICS & GYNECOLOGY

## 2019-04-21 PROCEDURE — 36415 COLL VENOUS BLD VENIPUNCTURE: CPT

## 2019-04-21 PROCEDURE — 85025 COMPLETE CBC W/AUTO DIFF WBC: CPT

## 2019-04-21 PROCEDURE — 99024 POSTOP FOLLOW-UP VISIT: CPT | Mod: ,,, | Performed by: OBSTETRICS & GYNECOLOGY

## 2019-04-21 PROCEDURE — 11000001 HC ACUTE MED/SURG PRIVATE ROOM

## 2019-04-21 RX ORDER — OXYCODONE AND ACETAMINOPHEN 5; 325 MG/1; MG/1
1 TABLET ORAL EVERY 4 HOURS PRN
Qty: 10 TABLET | Refills: 0 | Status: SHIPPED | OUTPATIENT
Start: 2019-04-21 | End: 2019-06-05

## 2019-04-21 RX ORDER — IBUPROFEN 600 MG/1
600 TABLET ORAL EVERY 6 HOURS PRN
Qty: 45 TABLET | Refills: 0 | Status: SHIPPED | OUTPATIENT
Start: 2019-04-21 | End: 2023-01-20

## 2019-04-21 RX ORDER — DOCUSATE SODIUM 100 MG/1
200 CAPSULE, LIQUID FILLED ORAL 2 TIMES DAILY PRN
Refills: 0 | COMMUNITY
Start: 2019-04-21 | End: 2019-06-05

## 2019-04-21 RX ADMIN — IBUPROFEN 600 MG: 600 TABLET ORAL at 10:04

## 2019-04-21 NOTE — ANESTHESIA POSTPROCEDURE EVALUATION
Anesthesia Post Evaluation    Patient: Makenna Mcintosh    Procedure(s) Performed: * No procedures listed *    Final Anesthesia Type: epidural  Patient location during evaluation: floor  Patient participation: Yes- Able to Participate  Level of consciousness: awake and alert  Post-procedure vital signs: reviewed and stable  Pain management: adequate  Airway patency: patent  PONV status at discharge: No PONV  Anesthetic complications: no      Cardiovascular status: blood pressure returned to baseline  Respiratory status: unassisted  Hydration status: euvolemic  Follow-up not needed.          Vitals Value Taken Time   /70 4/21/2019 12:00 AM   Temp 36.6 °C (97.9 °F) 4/21/2019 12:00 AM   Pulse 102 4/21/2019 12:00 AM   Resp 16 4/21/2019 12:00 AM   SpO2 96 % 4/21/2019 12:00 AM         No case tracking events are documented in the log.      Pain/Remy Score: Pain Rating Prior to Med Admin: 9 (4/20/2019  2:11 AM)

## 2019-04-21 NOTE — PLAN OF CARE
Problem: Pain (Postpartum Vaginal Delivery)  Goal: Acceptable Pain Control  Will continue to monitor and treat. Pt stable and will continue to monitor.

## 2019-04-21 NOTE — SUBJECTIVE & OBJECTIVE
Hospital course: Admitted to L+D, requests epidural. Ann-Marieetn progressed to vaginal delivery without complications.  PPD 1: Patient is without complaint, nursing well.    Interval History:     She is doing well this morning. She is tolerating a regular diet without nausea or vomiting. She is voiding spontaneously. She is ambulating. She has passed flatus, and has not a BM. Vaginal bleeding is mild. She denies fever or chills. Abdominal pain is mild and controlled with oral medications. She is breastfeeding.     Objective:     Vital Signs (Most Recent):  Temp: 97.9 °F (36.6 °C) (04/21/19 0000)  Pulse: 102 (04/21/19 0000)  Resp: 16 (04/21/19 0000)  BP: 116/70 (04/21/19 0000)  SpO2: 96 % (04/21/19 0000) Vital Signs (24h Range):  Temp:  [97.8 °F (36.6 °C)-98.3 °F (36.8 °C)] 97.9 °F (36.6 °C)  Pulse:  [] 102  Resp:  [16-18] 16  SpO2:  [96 %-99 %] 96 %  BP: (109-139)/(67-87) 116/70     Weight: 83 kg (182 lb 15.7 oz)  Body mass index is 33.47 kg/m².      Intake/Output Summary (Last 24 hours) at 4/21/2019 0724  Last data filed at 4/20/2019 2000  Gross per 24 hour   Intake --   Output 1600 ml   Net -1600 ml       Significant Labs:  Lab Results   Component Value Date    GROUPTRH O POS 04/20/2019    HEPBSAG Negative 09/13/2018    STREPBCULT No Group B Streptococcus isolated 03/21/2019     Recent Labs   Lab 04/21/19  0515   HGB 8.9*   HCT 28.1*       I have personallly reviewed all pertinent lab results from the last 24 hours.    Physical Exam:   Constitutional: She is oriented to person, place, and time. She appears well-developed and well-nourished.       Cardiovascular: Normal rate.     Pulmonary/Chest: Effort normal.        Abdominal: Soft. She exhibits no abdominal incision. There is no tenderness (No fundal tenderness).             Musculoskeletal: She exhibits edema (1+ edema bilaterally). She exhibits no tenderness.       Neurological: She is alert and oriented to person, place, and time.     Psychiatric: She has a  normal mood and affect.

## 2019-04-21 NOTE — PROGRESS NOTES
[facility]  Obstetrics  Postpartum Progress Note    Patient Name: Makenna Mcintosh  MRN: [id number]  Admission Date: 4/20/2019  Hospital Length of Stay: 1 days  Attending Physician: Liz Plata MD  Primary Care Provider: Nereida De La Rosa MD    Subjective:     Principal Problem:Vaginal delivery    Hospital course: Admitted to L+D, requests epidural. Patietn progressed to vaginal delivery without complications.  PPD 1: Patient is without complaint, nursing well.    Interval History:     She is doing well this morning. She is tolerating a regular diet without nausea or vomiting. She is voiding spontaneously. She is ambulating. She has passed flatus, and has not a BM. Vaginal bleeding is mild. She denies fever or chills. Abdominal pain is mild and controlled with oral medications. She is breastfeeding.     Objective:     Vital Signs (Most Recent):  Temp: 97.9 °F (36.6 °C) (04/21/19 0000)  Pulse: 102 (04/21/19 0000)  Resp: 16 (04/21/19 0000)  BP: 116/70 (04/21/19 0000)  SpO2: 96 % (04/21/19 0000) Vital Signs (24h Range):  Temp:  [97.8 °F (36.6 °C)-98.3 °F (36.8 °C)] 97.9 °F (36.6 °C)  Pulse:  [] 102  Resp:  [16-18] 16  SpO2:  [96 %-99 %] 96 %  BP: (109-139)/(67-87) 116/70     Weight: 83 kg (182 lb 15.7 oz)  Body mass index is 33.47 kg/m².      Intake/Output Summary (Last 24 hours) at 4/21/2019 0724  Last data filed at 4/20/2019 2000  Gross per 24 hour   Intake --   Output 1600 ml   Net -1600 ml       Significant Labs:  Lab Results   Component Value Date    GROUPTRH O POS 04/20/2019    HEPBSAG Negative 09/13/2018    STREPBCULT No Group B Streptococcus isolated 03/21/2019     Recent Labs   Lab 04/21/19  0515   HGB 8.9*   HCT 28.1*       I have personallly reviewed all pertinent lab results from the last 24 hours.    Physical Exam:   Constitutional: She is oriented to person, place, and time. She appears well-developed and well-nourished.       Cardiovascular: Normal rate.      Pulmonary/Chest: Effort normal.        Abdominal: Soft. She exhibits no abdominal incision. There is no tenderness (No fundal tenderness).             Musculoskeletal: She exhibits edema (1+ edema bilaterally). She exhibits no tenderness.       Neurological: She is alert and oriented to person, place, and time.     Psychiatric: She has a normal mood and affect.       Assessment/Plan:     23 y.o. female  for:    * Vaginal delivery  Stable postpartum, continue postpartum care.        Disposition: As patient meets milestones, will plan to discharge in am.    Cynthia French MD  Obstetrics  [facility]

## 2019-04-22 VITALS
SYSTOLIC BLOOD PRESSURE: 127 MMHG | BODY MASS INDEX: 33.68 KG/M2 | HEIGHT: 62 IN | HEART RATE: 75 BPM | DIASTOLIC BLOOD PRESSURE: 86 MMHG | OXYGEN SATURATION: 97 % | TEMPERATURE: 98 F | WEIGHT: 183 LBS | RESPIRATION RATE: 18 BRPM

## 2019-04-22 PROCEDURE — 99024 POSTOP FOLLOW-UP VISIT: CPT | Mod: ,,, | Performed by: OBSTETRICS & GYNECOLOGY

## 2019-04-22 PROCEDURE — 99024 PR POST-OP FOLLOW-UP VISIT: ICD-10-PCS | Mod: ,,, | Performed by: OBSTETRICS & GYNECOLOGY

## 2019-04-22 PROCEDURE — 25000003 PHARM REV CODE 250: Performed by: OBSTETRICS & GYNECOLOGY

## 2019-04-22 RX ADMIN — IBUPROFEN 600 MG: 600 TABLET ORAL at 08:04

## 2019-04-22 NOTE — PROGRESS NOTES
Instructed on the risks of early pacifier or artificial nipple use, including:  · Decreased milk supply due to decreased breast stimulation from delayed or skipped feedings with pacifier use  · Nipple confusion due to the promotion of non-nutritive sucking on the pacifier/nipple  · Increased nipple soreness due to non-nutritive sucking  · Skipped feedings the increases chance of engorgement, mastitis and plugged ducts  · Decreases the duration of exclusive breastfeeding  States understanding and verbalized appropriate recall.     Discussed the risks of the introduction of an artificial nipple.  Encouraged to put the baby to the breast when feeding cues are present.  Discussed the AAP recommendation of no bottles or pacifiers until at least 1 month of age.  States understanding verbalized appropriate recall.  Pt states that her intention is to offer an artificial nipple at this time.  Request honored.  Instructed that she must provide her own pacifier.

## 2019-04-22 NOTE — PROGRESS NOTES
Pt ambulating and voiding without difficulty. Patient safety maintained, side rails up, bed low and locked position.  Pain well controlled with PRN pain medication. Fundus midline, firm, with moderate lochia. VSS. Significant other at bedside; parents responding to infant cues and bonding appropriately. Will continue to monitor.

## 2019-04-22 NOTE — SUBJECTIVE & OBJECTIVE
Hospital course: Admitted to L+D, requests epidural. Mariam progressed to vaginal delivery without complications.  PPD 1: Patient is without complaint, nursing well.  PPD2: Makenna continues to do well without major complaints.  She is ready for d/c home    She is doing well this morning. She is tolerating a regular diet without nausea or vomiting. She is voiding spontaneously. She is ambulating. She has passed flatus, and has a BM. Vaginal bleeding is mild. She denies fever or chills. Abdominal pain is mild and controlled with oral medications. She is breastfeeding.    Objective:     Vital Signs (Most Recent):  Temp: 98.3 °F (36.8 °C) (04/22/19 0745)  Pulse: 75 (04/22/19 0745)  Resp: 18 (04/22/19 0745)  BP: 127/86 (04/22/19 0745)  SpO2: 97 % (04/22/19 0745) Vital Signs (24h Range):  Temp:  [97.5 °F (36.4 °C)-98.3 °F (36.8 °C)] 98.3 °F (36.8 °C)  Pulse:  [75-95] 75  Resp:  [18-19] 18  SpO2:  [97 %-98 %] 97 %  BP: (112-141)/(65-86) 127/86     Weight: 83 kg (182 lb 15.7 oz)  Body mass index is 33.47 kg/m².    No intake or output data in the 24 hours ending 04/22/19 0957    Significant Labs:  Lab Results   Component Value Date    GROUPTRH O POS 04/20/2019    HEPBSAG Negative 09/13/2018    STREPBCULT No Group B Streptococcus isolated 03/21/2019     Recent Labs   Lab 04/21/19  0515   HGB 8.9*   HCT 28.1*       I have personallly reviewed all pertinent lab results from the last 24 hours.    Physical Exam:   Constitutional: She is oriented to person, place, and time. She appears well-developed and well-nourished. No distress.    HENT:   Head: Normocephalic and atraumatic.    Eyes: Conjunctivae are normal.      Pulmonary/Chest: Effort normal. No respiratory distress.        Abdominal: Soft. She exhibits no distension. There is no tenderness. There is no rebound and no guarding.   Fundus firm, NT, below umbilicus                   Neurological: She is alert and oriented to person, place, and time.    Skin: Skin is warm and  dry. No rash noted. She is not diaphoretic. No erythema.    Psychiatric: She has a normal mood and affect.

## 2019-04-22 NOTE — DISCHARGE SUMMARY
[facility]  Obstetrics  Discharge Summary      Patient Name: Makenna Mcintosh  MRN: [id number]  Admission Date: 2019  Hospital Length of Stay: 2 days  Discharge Date and Time:  2019 10:04 AM  Attending Physician: Liz Plata MD   Discharging Provider: Glory Carmichael DO  Primary Care Provider: Nereida De La Rosa MD    HPI: 24 yo  39.5 weeks presents c/o painful contractions. Patient reports good fetal movement, bloody show but no leakage of fluid.  Patient was seen in the OB ED yesterday c/o possible SROM and painful contractions, was observed and given Stadol and Phenergan for theraputic rest. She reports that after returning home contractions improved, but have since become much worse. They were 3 minutes apart at home and are now 5-7 minutes apart.   Mariam requests an additional dose on Stadol and Phenergan.        * No surgery found *     Hospital Course:   Admitted to L+D, requests epidural. Mariam progressed to vaginal delivery without complications.  PPD 1: Patient is without complaint, nursing well.  PPD2: Makenna continues to do well without major complaints.  She is ready for d/c home        Final Active Diagnoses:    Diagnosis Date Noted POA    PRINCIPAL PROBLEM:  Vaginal delivery [O80] 2019 Not Applicable      Problems Resolved During this Admission:    Diagnosis Date Noted Date Resolved POA    Normal labor [O80, Z37.9] 2019 Not Applicable    Term pregnancy [Z34.80] 2019 Not Applicable    39 weeks gestation of pregnancy [Z3A.39] 2019 Not Applicable          Feeding Method: breast    Immunizations     Date Immunization Status Dose Route/Site Given by    19 1800 MMR Incomplete 0.5 mL Subcutaneous/Left deltoid     19 1800 Tdap Incomplete 0.5 mL Intramuscular/Left deltoid           Delivery:    Episiotomy: None   Lacerations: None   Repair suture:     Repair # of packets: 1   Blood loss (ml): 300      Birth information:  YOB: 2021   Time of birth: 3:41 PM   Sex: female   Delivery type: Vaginal, Spontaneous   Gestational Age: 39w5d    Delivery Clinician:      Other providers:       Additional  information:  Forceps:    Vacuum:    Breech:    Observed anomalies      Living?:           APGARS  One minute Five minutes Ten minutes   Skin color:         Heart rate:         Grimace:         Muscle tone:         Breathing:         Totals: 8  9        Placenta: Delivered:       appearance    Pending Diagnostic Studies:     Procedure Component Value Units Date/Time    RPR [[id number]] Collected:  04/20/19 1243    Order Status:  Sent Lab Status:  In process Updated:  04/20/19 1309    Specimen:  Blood           Discharged Condition: good    Disposition: Home or Self Care    Follow Up:  Follow-up Information     Liz Plata MD In 6 weeks.    Specialties:  Obstetrics, Obstetrics and Gynecology  Why:  Post partum exam  Contact information:  [address]  [address] [place] [id number]  [phone]                 Patient Instructions:      Call MD for:  temperature >100.4     Call MD for:  persistent nausea and vomiting or diarrhea     Call MD for:  severe uncontrolled pain     Call MD for:  redness, tenderness, or signs of infection (pain, swelling, redness, odor or green/yellow discharge around incision site)     No dressing needed     Other restrictions (specify):   Order Comments: Pelvic rest x 6 weeks     Activity as tolerated     Shower on day dressing removed (No bath)     Medications:  Current Discharge Medication List      START taking these medications    Details   docusate sodium (COLACE) 100 MG capsule Take 2 capsules (200 mg total) by mouth 2 (two) times daily as needed for Constipation.  Refills: 0      ibuprofen (ADVIL,MOTRIN) 600 MG tablet Take 1 tablet (600 mg total) by mouth every 6 (six) hours as needed (cramping).  Qty: 45 tablet, Refills: 0      oxyCODONE-acetaminophen  (PERCOCET) 5-325 mg per tablet Take 1 tablet by mouth every 4 (four) hours as needed.  Qty: 10 tablet, Refills: 0         CONTINUE these medications which have NOT CHANGED    Details   albuterol 90 mcg/actuation inhaler Inhale 2 puffs into the lungs every 6 (six) hours as needed for Wheezing or Shortness of Breath. Rescue  Qty: 18 g, Refills: 0      calcium carbonate (TUMS ORAL) Take by mouth.      pedi multivit 43-iron fumarate (FLINTSTONES COMPLETE, IRON,) 18 mg iron Chew Take 1 tablet by mouth once daily.         STOP taking these medications       nitrofurantoin, macrocrystal-monohydrate, (MACROBID) 100 MG capsule Comments:   Reason for Stopping:         ondansetron (ZOFRAN-ODT) 8 MG TbDL Comments:   Reason for Stopping:               Glory Carmichael DO  Obstetrics  [facility]

## 2019-04-22 NOTE — PLAN OF CARE
Problem: Breastfeeding  Goal: Effective Breastfeeding  Outcome: Outcome(s) achieved Date Met: 04/22/19   Encouraged nursing infant at least 8 times in 24 hours on cue until content. Discouraged bottles and pacifiers and risks of both discussed as well as benefits of breastfeeding.

## 2019-04-22 NOTE — DISCHARGE INSTRUCTIONS
Breastfeeding Discharge Instructions       Feed the baby at the earliest sign of hunger or comfort  o Hands to mouth, sucking motions  o Rooting or searching for something to suck on  o Dont wait for crying - it is a sign of distress     The feedings may be 8-12 times per 24hrs and will not follow a schedule   Avoid pacifiers and bottles for the first 4 weeks   Alternate the breast you start the feeding with, or start with the breast that feels the fullest   Switch breasts when the baby takes himself off the breast or falls asleep   Keep offering breasts until the baby looks full, no longer gives hunger signs, and stays asleep when placed on his back in the crib   If the baby is sleepy and wont wake for a feeding, put the baby skin-to-skin dressed in a diaper against the mothers bare chest   Sleep near your baby   The baby should be positioned and latched on to the breast correctly  o Chest-to-chest, chin in the breast  o Babys lips are flipped outward  o Babys mouth is stretched open wide like a shout  o Babys sucking should feel like tugging to the mother  - The baby should be drinking at the breast:  o You should hear swallowing or gulping throughout the feeding  o You should see milk on the babys lips when he comes off the breast  o Your breasts should be softer when the baby is finished feeding  o The baby should look relaxed at the end of feedings  o After the 4th day and your milk is in:  o The babys poop should turn bright yellow and be loose, watery, and seedy  o The baby should have at least 3-4 poops the size of the palm of your hand per day  o The baby should have at least 5-6 wet diapers per day  o The urine should be light yellow in color  You should drink when you are thirsty and eat a healthy diet when you are    hungry.     Take naps to get the rest you need.   Take medications and/or drink alcohol only with permission of your obstetrician    or the babys pediatrician.  You can  also call the Infant Risk Center,   ([phone]), Monday-Friday, 8am-5pm Central time, to get the most   up-to-date evidence-based information on the use of medications during   pregnancy and breastfeeding.      The baby should be examined by a pediatrician at 3-5 days of age.  Once your   milk comes in, the baby should be gaining at least ½ - 1oz each day and should be back to birthweight no later than 10-14 days of age.          Community Resources    [facility] Breastfeeding Warmline: [phone]   Local [facility] clinics: provide incentives and breastpumps to eligible mothers  [employer] (LLLI):  mother-to-mother support group website        www.EBOOKAPLACEl.[facility]  Local La Leche League mother-to-mother support groups:        www.PriceMe        La Leche League [facility]   Dr. Bayron Sheikh website for latch videos and general information:        www.breastfeedinginc.ca  [facility] Risk [facility] is a call center that provides information about the safety of taking medications while breastfeeding.  Call 1-158.425.5085, M-F, 8am-5pm, [place].  International Lactation Consultant Association provides resources for assistance:        www.ilca.org  [place] Breastfeeding Coalition provides informationand resources for parents  and the community    www.LaBreastfeedingSupport.org     Raquel Ramos is a mom-to-mom support group:                             www.TheracosbeboBoxCast.com//breastfeedng-support/  [employer] for Healthy Babies:  5-470-630-BABY(1779)  Cafe [employer] Lait: a breastfeeding support group for women of color, 504.216.6858

## 2019-04-22 NOTE — PROGRESS NOTES
[facility]  Obstetrics  Postpartum Progress Note    Patient Name: Makenna Mcintosh  MRN: [id number]  Admission Date: 4/20/2019  Hospital Length of Stay: 2 days  Attending Physician: Liz Plata MD  Primary Care Provider: Nereida De La Rosa MD    Subjective:     Principal Problem:Vaginal delivery    Hospital course: Admitted to L+D, requests epidural. Patietn progressed to vaginal delivery without complications.  PPD 1: Patient is without complaint, nursing well.  PPD2: Makenna continues to do well without major complaints.  She is ready for d/c home    She is doing well this morning. She is tolerating a regular diet without nausea or vomiting. She is voiding spontaneously. She is ambulating. She has passed flatus, and has a BM. Vaginal bleeding is mild. She denies fever or chills. Abdominal pain is mild and controlled with oral medications. She is breastfeeding.    Objective:     Vital Signs (Most Recent):  Temp: 98.3 °F (36.8 °C) (04/22/19 0745)  Pulse: 75 (04/22/19 0745)  Resp: 18 (04/22/19 0745)  BP: 127/86 (04/22/19 0745)  SpO2: 97 % (04/22/19 0745) Vital Signs (24h Range):  Temp:  [97.5 °F (36.4 °C)-98.3 °F (36.8 °C)] 98.3 °F (36.8 °C)  Pulse:  [75-95] 75  Resp:  [18-19] 18  SpO2:  [97 %-98 %] 97 %  BP: (112-141)/(65-86) 127/86     Weight: 83 kg (182 lb 15.7 oz)  Body mass index is 33.47 kg/m².    No intake or output data in the 24 hours ending 04/22/19 0957    Significant Labs:  Lab Results   Component Value Date    GROUPTRH O POS 04/20/2019    HEPBSAG Negative 09/13/2018    STREPBCULT No Group B Streptococcus isolated 03/21/2019     Recent Labs   Lab 04/21/19  0515   HGB 8.9*   HCT 28.1*       I have personallly reviewed all pertinent lab results from the last 24 hours.    Physical Exam:   Constitutional: She is oriented to person, place, and time. She appears well-developed and well-nourished. No distress.    HENT:   Head: Normocephalic and atraumatic.    Eyes: Conjunctivae are normal.       Pulmonary/Chest: Effort normal. No respiratory distress.        Abdominal: Soft. She exhibits no distension. There is no tenderness. There is no rebound and no guarding.   Fundus firm, NT, below umbilicus                   Neurological: She is alert and oriented to person, place, and time.    Skin: Skin is warm and dry. No rash noted. She is not diaphoretic. No erythema.    Psychiatric: She has a normal mood and affect.       Assessment/Plan:     23 y.o. female  for:    * Vaginal delivery  Good postpartum condition, stable for d/c home        Disposition: As patient meets milestones, will plan to discharge today.    Glory Carmichael,   Obstetrics  [facility]

## 2019-04-23 LAB — RPR SER QL: NORMAL

## 2019-04-24 ENCOUNTER — TELEPHONE (OUTPATIENT)
Dept: INTERNAL MEDICINE | Facility: CLINIC | Age: 24
End: 2019-04-24

## 2019-04-28 NOTE — PROGRESS NOTES
Here for routine OB appt with no complaints.  Reports good FM.    Denies LOF, denies VB, denies contractions.  Reviewed warning signs of Labor, Bleeding, & Preeclampsia.    Daily FM counts reinforced - decreased FM prec given.  F/U scheduled 1 week..

## 2019-04-29 ENCOUNTER — TELEPHONE (OUTPATIENT)
Dept: OBSTETRICS AND GYNECOLOGY | Facility: OTHER | Age: 24
End: 2019-04-29

## 2019-05-28 ENCOUNTER — PATIENT MESSAGE (OUTPATIENT)
Dept: INTERNAL MEDICINE | Facility: CLINIC | Age: 24
End: 2019-05-28

## 2019-06-05 ENCOUNTER — POSTPARTUM VISIT (OUTPATIENT)
Dept: OBSTETRICS AND GYNECOLOGY | Facility: CLINIC | Age: 24
End: 2019-06-05
Payer: COMMERCIAL

## 2019-06-05 VITALS
SYSTOLIC BLOOD PRESSURE: 122 MMHG | DIASTOLIC BLOOD PRESSURE: 80 MMHG | WEIGHT: 166.75 LBS | BODY MASS INDEX: 30.69 KG/M2 | HEIGHT: 62 IN

## 2019-06-05 DIAGNOSIS — E34.9 HORMONE IMBALANCE: Primary | ICD-10-CM

## 2019-06-05 DIAGNOSIS — Z30.41 ENCOUNTER FOR SURVEILLANCE OF CONTRACEPTIVE PILLS: ICD-10-CM

## 2019-06-05 PROCEDURE — 0503F POSTPARTUM CARE VISIT: CPT | Mod: S$GLB,,, | Performed by: OBSTETRICS & GYNECOLOGY

## 2019-06-05 PROCEDURE — 99999 PR PBB SHADOW E&M-EST. PATIENT-LVL III: CPT | Mod: PBBFAC,,, | Performed by: OBSTETRICS & GYNECOLOGY

## 2019-06-05 PROCEDURE — 99999 PR PBB SHADOW E&M-EST. PATIENT-LVL III: ICD-10-PCS | Mod: PBBFAC,,, | Performed by: OBSTETRICS & GYNECOLOGY

## 2019-06-05 PROCEDURE — 0503F PR POSTPARTUM CARE VISIT: ICD-10-PCS | Mod: S$GLB,,, | Performed by: OBSTETRICS & GYNECOLOGY

## 2019-06-05 RX ORDER — ACETAMINOPHEN AND CODEINE PHOSPHATE 120; 12 MG/5ML; MG/5ML
1 SOLUTION ORAL DAILY
Qty: 30 TABLET | Refills: 11 | Status: SHIPPED | OUTPATIENT
Start: 2019-06-05 | End: 2023-01-20

## 2019-06-05 RX ORDER — PROGESTERONE 200 MG/1
200 CAPSULE ORAL DAILY
Qty: 30 CAPSULE | Refills: 2 | Status: SHIPPED | OUTPATIENT
Start: 2019-06-05 | End: 2019-06-27 | Stop reason: SDUPTHER

## 2019-06-05 NOTE — PROGRESS NOTES
"Makenna Mcintosh is a 23 y.o. female  presents for a postpartum visit.  She is status post  6 weeks ago.  Her hospitalization was not complicated.  She is breastfeeding.  She desires no method for contraception. She says she has felt a little down and occasionally cries.  No SI or HI.  She took Celexa in the past for depression.    Her last annual exam was 2016      Current Outpatient Medications:     albuterol 90 mcg/actuation inhaler, Inhale 2 puffs into the lungs every 6 (six) hours as needed for Wheezing or Shortness of Breath. Rescue, Disp: 18 g, Rfl: 0    calcium carbonate (TUMS ORAL), Take by mouth., Disp: , Rfl:     fexofenadine HCl (ALLEGRA ALLERGY ORAL), Take 1 tablet by mouth as needed., Disp: , Rfl:     ibuprofen (ADVIL,MOTRIN) 600 MG tablet, Take 1 tablet (600 mg total) by mouth every 6 (six) hours as needed (cramping)., Disp: 45 tablet, Rfl: 0    pedi multivit 43-iron fumarate (FLINTSTONES COMPLETE, IRON,) 18 mg iron Chew, Take 1 tablet by mouth once daily., Disp: , Rfl:     norethindrone (ORTHO MICRONOR) 0.35 mg tablet, Take 1 tablet (0.35 mg total) by mouth once daily., Disp: 30 tablet, Rfl: 11    progesterone (PROMETRIUM) 200 MG capsule, Take 1 capsule (200 mg total) by mouth once daily., Disp: 30 capsule, Rfl: 2    Vitals:    19 1329   BP: 122/80   Weight: 75.7 kg (166 lb 12.5 oz)   Height: 5' 2" (1.575 m)   PainSc: 0-No pain     Body mass index is 30.5 kg/m².    Physical Exam:  General: healthy, alert, no distress  Abdomen: no masses, hepatosplenomegaly, no hernias  External genitalia: normal, well-healed, without lesions or masses  Vagina: normal, well-healed, physiologic discharge, without lesions  Cervix: normal, well-healed, without lesions  Uterus: normal size, well involuted, firm, non-tender  Adnexa: no masses palpable and nontender    Assessment:  Normal postpartum exam    Plan:    Hormone imbalance  -     progesterone (PROMETRIUM) 200 MG capsule; Take 1 capsule " (200 mg total) by mouth once daily.  Dispense: 30 capsule; Refill: 2    Postpartum follow-up    Encounter for surveillance of contraceptive pills  -     norethindrone (ORTHO MICRONOR) 0.35 mg tablet; Take 1 tablet (0.35 mg total) by mouth once daily.  Dispense: 30 tablet; Refill: 11     status post spontaneous vaginal delivery.    May resume regular activity.  Follow up in 2 weeks.

## 2019-06-20 ENCOUNTER — POSTPARTUM VISIT (OUTPATIENT)
Dept: OBSTETRICS AND GYNECOLOGY | Facility: CLINIC | Age: 24
End: 2019-06-20
Payer: COMMERCIAL

## 2019-06-20 VITALS
SYSTOLIC BLOOD PRESSURE: 116 MMHG | DIASTOLIC BLOOD PRESSURE: 68 MMHG | BODY MASS INDEX: 31.11 KG/M2 | WEIGHT: 169.06 LBS | HEIGHT: 62 IN

## 2019-06-20 PROCEDURE — 99999 PR PBB SHADOW E&M-EST. PATIENT-LVL III: ICD-10-PCS | Mod: PBBFAC,,, | Performed by: OBSTETRICS & GYNECOLOGY

## 2019-06-20 PROCEDURE — 3008F PR BODY MASS INDEX (BMI) DOCUMENTED: ICD-10-PCS | Mod: CPTII,S$GLB,, | Performed by: OBSTETRICS & GYNECOLOGY

## 2019-06-20 PROCEDURE — 99213 OFFICE O/P EST LOW 20 MIN: CPT | Mod: S$GLB,,, | Performed by: OBSTETRICS & GYNECOLOGY

## 2019-06-20 PROCEDURE — 3008F BODY MASS INDEX DOCD: CPT | Mod: CPTII,S$GLB,, | Performed by: OBSTETRICS & GYNECOLOGY

## 2019-06-20 PROCEDURE — 99999 PR PBB SHADOW E&M-EST. PATIENT-LVL III: CPT | Mod: PBBFAC,,, | Performed by: OBSTETRICS & GYNECOLOGY

## 2019-06-20 PROCEDURE — 99213 PR OFFICE/OUTPT VISIT, EST, LEVL III, 20-29 MIN: ICD-10-PCS | Mod: S$GLB,,, | Performed by: OBSTETRICS & GYNECOLOGY

## 2019-06-20 RX ORDER — SERTRALINE HYDROCHLORIDE 50 MG/1
50 TABLET, FILM COATED ORAL DAILY
Qty: 30 TABLET | Refills: 2 | Status: SHIPPED | OUTPATIENT
Start: 2019-06-20 | End: 2019-09-22 | Stop reason: SDUPTHER

## 2019-06-20 NOTE — PROGRESS NOTES
"Subjective:       Makenna Mcintosh is a 23 y.o. female who presents for follow up of postpartum depression. Current symptoms include depressed mood and it just comes in waves.. Symptoms have been unchanged since that time. Patient denies difficulty concentrating, feelings of worthlessness/guilt, hopelessness, hypersomnia and impaired memory. She denies SI or HI.  Previous treatment includes: medication. She complains of the following side effects from the treatment: fatigue.  She doesn't feel much better on 400 mg of progesterone and it is too sedating.    The following portions of the patient's history were reviewed and updated as appropriate: allergies, current medications, past family history, past medical history, past social history, past surgical history and problem list.    Review of Systems  Pertinent items are noted in HPI.      Objective:      /68   Ht 5' 2" (1.575 m)   Wt 76.7 kg (169 lb 1.5 oz)   LMP 06/17/2019 (Exact Date)   Breastfeeding? Yes   BMI 30.93 kg/m²    General:  alert, appears stated age and cooperative   Affect & Behavior:  full facial expressions appears tired         Assessment:      Depression, unchanged        Plan:      Medications: Zoloft. 50 mg PO QD  Refer to Behavioral Health services  Exercise, meditation, and Yoga discussed  F/U 1 month  "

## 2019-06-27 DIAGNOSIS — E34.9 HORMONE IMBALANCE: ICD-10-CM

## 2019-06-27 RX ORDER — PROGESTERONE 200 MG/1
200 CAPSULE ORAL DAILY
Qty: 30 CAPSULE | Refills: 2 | Status: SHIPPED | OUTPATIENT
Start: 2019-06-27 | End: 2023-01-20

## 2019-07-15 ENCOUNTER — TELEPHONE (OUTPATIENT)
Dept: OBSTETRICS AND GYNECOLOGY | Facility: CLINIC | Age: 24
End: 2019-07-15

## 2019-08-05 ENCOUNTER — PATIENT MESSAGE (OUTPATIENT)
Dept: OBSTETRICS AND GYNECOLOGY | Facility: CLINIC | Age: 24
End: 2019-08-05

## 2019-09-13 ENCOUNTER — CLINICAL SUPPORT (OUTPATIENT)
Dept: FAMILY MEDICINE | Facility: CLINIC | Age: 24
End: 2019-09-13
Payer: COMMERCIAL

## 2019-09-13 DIAGNOSIS — Z23 FLU VACCINE NEED: Primary | ICD-10-CM

## 2019-09-13 PROCEDURE — 90686 FLU VACCINE (QUAD) GREATER THAN OR EQUAL TO 3YO PRESERVATIVE FREE IM: ICD-10-PCS | Mod: S$GLB,,, | Performed by: INTERNAL MEDICINE

## 2019-09-13 PROCEDURE — 90471 FLU VACCINE (QUAD) GREATER THAN OR EQUAL TO 3YO PRESERVATIVE FREE IM: ICD-10-PCS | Mod: S$GLB,,, | Performed by: INTERNAL MEDICINE

## 2019-09-13 PROCEDURE — 90471 IMMUNIZATION ADMIN: CPT | Mod: S$GLB,,, | Performed by: INTERNAL MEDICINE

## 2019-09-13 PROCEDURE — 90686 IIV4 VACC NO PRSV 0.5 ML IM: CPT | Mod: S$GLB,,, | Performed by: INTERNAL MEDICINE

## 2019-09-22 RX ORDER — SERTRALINE HYDROCHLORIDE 50 MG/1
TABLET, FILM COATED ORAL
Qty: 90 TABLET | Refills: 0 | Status: SHIPPED | OUTPATIENT
Start: 2019-09-22 | End: 2019-10-28 | Stop reason: SDUPTHER

## 2019-10-28 RX ORDER — SERTRALINE HYDROCHLORIDE 50 MG/1
50 TABLET, FILM COATED ORAL DAILY
Qty: 90 TABLET | Refills: 0 | Status: SHIPPED | OUTPATIENT
Start: 2019-10-28 | End: 2023-01-20

## 2019-11-11 NOTE — TELEPHONE ENCOUNTER
Left voicemail asking pt to call back to reschedule her canceled appointment last week due to the weather. Pt was coming in for f/u of PPD.    partial/upper

## 2019-11-18 ENCOUNTER — PATIENT MESSAGE (OUTPATIENT)
Dept: INTERNAL MEDICINE | Facility: CLINIC | Age: 24
End: 2019-11-18

## 2019-11-20 RX ORDER — AMOXICILLIN 875 MG/1
875 TABLET, FILM COATED ORAL EVERY 12 HOURS
Qty: 20 TABLET | Refills: 0 | Status: SHIPPED | OUTPATIENT
Start: 2019-11-20 | End: 2023-01-20

## 2020-01-08 ENCOUNTER — DOCUMENTATION ONLY (OUTPATIENT)
Dept: INTERNAL MEDICINE | Facility: CLINIC | Age: 25
End: 2020-01-08

## 2020-01-20 RX ORDER — SERTRALINE HYDROCHLORIDE 50 MG/1
TABLET, FILM COATED ORAL
Qty: 90 TABLET | Refills: 0 | OUTPATIENT
Start: 2020-01-20

## 2020-05-04 ENCOUNTER — PATIENT MESSAGE (OUTPATIENT)
Dept: INTERNAL MEDICINE | Facility: CLINIC | Age: 25
End: 2020-05-04

## 2020-05-06 ENCOUNTER — OFFICE VISIT (OUTPATIENT)
Dept: INTERNAL MEDICINE | Facility: CLINIC | Age: 25
End: 2020-05-06
Payer: COMMERCIAL

## 2020-05-06 VITALS
BODY MASS INDEX: 30.18 KG/M2 | SYSTOLIC BLOOD PRESSURE: 116 MMHG | DIASTOLIC BLOOD PRESSURE: 68 MMHG | WEIGHT: 165 LBS | HEART RATE: 75 BPM

## 2020-05-06 DIAGNOSIS — L30.9 ECZEMA, UNSPECIFIED TYPE: ICD-10-CM

## 2020-05-06 DIAGNOSIS — R21 RASH: Primary | ICD-10-CM

## 2020-05-06 PROCEDURE — 3008F BODY MASS INDEX DOCD: CPT | Mod: CPTII,,, | Performed by: INTERNAL MEDICINE

## 2020-05-06 PROCEDURE — 99213 OFFICE O/P EST LOW 20 MIN: CPT | Mod: 95,,, | Performed by: INTERNAL MEDICINE

## 2020-05-06 PROCEDURE — 3008F PR BODY MASS INDEX (BMI) DOCUMENTED: ICD-10-PCS | Mod: CPTII,,, | Performed by: INTERNAL MEDICINE

## 2020-05-06 PROCEDURE — 99213 PR OFFICE/OUTPT VISIT, EST, LEVL III, 20-29 MIN: ICD-10-PCS | Mod: 95,,, | Performed by: INTERNAL MEDICINE

## 2020-05-06 NOTE — PROGRESS NOTES
Subjective:       Patient ID: Makenna Mcintosh is a 24 y.o. female.    Chief Complaint: Rash    HPI 24-year-old female presents to clinic today telemedicine visit she was having some red spots on her hands and feet they are very itchy.  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  Please see photographs and patient's chart that she sent in through patient portal attachments.  She does have a child is 1 year of age who recently started going to his sitter with 1 other child her child has not been ill at all no fever no symptoms consistent with  Review of Systems  hand-foot-mouth syndrome otherwise negative she also denies any fever chills or flu-like symptoms  Objective:      Physical Exam  General: Well-appearing, well-nourished.  No distress  HEENT: conjunctivae are normal.         Hearing is grossly normal.  Nasopharynx carol congestion  Oropharynx is clear.  Neck: Supple.  Visually No thyroid megaly. Lymph: No cervical or supraclavicular adenopathy.  Heart: Regular rate   Lungs:  respiratory effort normal.  Abdomen:  nontender  Extremities:   No edema.  Psych: Oriented to time person place.  Judgment and insight seem unimpaired.  Mood and affect are appropriate.  Assessment:/plan       1. Dermatitis due presentation suspect that this more likely is a type of eczema since patient has a history of this.  Responded well to steroid she is advised to moisturize well and use topical steroid cream if needed.  Left likely hand-foot-mouth syndrome with the lack of I will prodrome and both herself and child./plan.  follow-up as needed  Plan:       The patient location is:  [place] work  The chief complaint leading to consultation is:  Rash  Visit type: audiovisual  Total time spent with patient:  20 minutes  Each patient to whom he or she provides medical services by telemedicine is:  (1) informed of the relationship between the physician and patient and the  respective role of any other health care provider with respect to management of the patient; and (2) notified that he or she may decline to receive medical services by telemedicine and may withdraw from such care at any time.    Notes:  See above

## 2020-07-08 ENCOUNTER — LAB VISIT (OUTPATIENT)
Dept: URGENT CARE | Facility: CLINIC | Age: 25
End: 2020-07-08
Payer: COMMERCIAL

## 2020-07-08 DIAGNOSIS — J02.9 SORE THROAT: ICD-10-CM

## 2020-07-08 PROCEDURE — U0003 INFECTIOUS AGENT DETECTION BY NUCLEIC ACID (DNA OR RNA); SEVERE ACUTE RESPIRATORY SYNDROME CORONAVIRUS 2 (SARS-COV-2) (CORONAVIRUS DISEASE [COVID-19]), AMPLIFIED PROBE TECHNIQUE, MAKING USE OF HIGH THROUGHPUT TECHNOLOGIES AS DESCRIBED BY CMS-2020-01-R: HCPCS

## 2020-07-11 LAB — SARS-COV-2 RNA RESP QL NAA+PROBE: NOT DETECTED

## 2020-10-05 ENCOUNTER — PATIENT MESSAGE (OUTPATIENT)
Dept: ADMINISTRATIVE | Facility: HOSPITAL | Age: 25
End: 2020-10-05

## 2020-10-16 ENCOUNTER — PATIENT MESSAGE (OUTPATIENT)
Dept: INTERNAL MEDICINE | Facility: CLINIC | Age: 25
End: 2020-10-16

## 2020-12-29 ENCOUNTER — PATIENT MESSAGE (OUTPATIENT)
Dept: INTERNAL MEDICINE | Facility: CLINIC | Age: 25
End: 2020-12-29

## 2021-01-07 NOTE — PROGRESS NOTES
Addendum  created 01/07/21 1101 by Delmy Brock CRNA    Clinical Note Signed, Intraprocedure Event edited       Here for routine OB appt with no complaints.  Reports good FM.    Denies LOF, denies VB, denies contractions.  Reviewed warning signs of Labor, Bleeding, & Preeclampsia.    Daily FM counts reinforced - decreased FM prec given.  F/U scheduled 2 weeks.

## 2021-04-05 ENCOUNTER — PATIENT MESSAGE (OUTPATIENT)
Dept: ADMINISTRATIVE | Facility: HOSPITAL | Age: 26
End: 2021-04-05

## 2021-07-06 ENCOUNTER — PATIENT MESSAGE (OUTPATIENT)
Dept: ADMINISTRATIVE | Facility: HOSPITAL | Age: 26
End: 2021-07-06

## 2021-10-04 ENCOUNTER — PATIENT MESSAGE (OUTPATIENT)
Dept: ADMINISTRATIVE | Facility: HOSPITAL | Age: 26
End: 2021-10-04

## 2022-01-10 ENCOUNTER — PATIENT MESSAGE (OUTPATIENT)
Dept: ADMINISTRATIVE | Facility: HOSPITAL | Age: 27
End: 2022-01-10
Payer: COMMERCIAL

## 2022-02-23 NOTE — TELEPHONE ENCOUNTER
37w 3d pt is requesting a jury duty excuse for 4/8/19. Are you ok with sending this for pt?   Pt arrived to floor from ED via cart. Pt able to ambulate into room with SBA.   Subjective:       Patient ID: Verenice Mirza is a 65 y.o. female.    Chief Complaint: No chief complaint on file.    HPI 66 Y/O female with history of ADH on raloxifene.                  The patient location is: home.  The chief complaint leading to consultation is: breast cancer    Visit type: audiovisual    Face to Face time with patient: 5 minutes  10 minutes of total time spent on the encounter, which includes face to face time and non-face to face time preparing to see the patient (eg, review of tests), Obtaining and/or reviewing separately obtained history, Documenting clinical information in the electronic or other health record, Independently interpreting results (not separately reported) and communicating results to the patient/family/caregiver, or Care coordination (not separately reported).         Each patient to whom he or she provides medical services by telemedicine is:  (1) informed of the relationship between the physician and patient and the respective role of any other health care provider with respect to management of the patient; and (2) notified that he or she may decline to receive medical services by telemedicine and may withdraw from such care at any time.    Notes:   Mammogram in July was negative.  Breast MRI on 1/11/22 was negative    She is feeling well, down to 220 lbs. Had meniscus surgery last year  -improving from that.    History:  Mammogram from July 1, 2019 showed architectural distortion left breast 2:00 position.  There was nothing seen by ultrasound.    On July 8, 2019 a needle biopsy was performed which showed benign breast tissue with apocrine adenosis, calcifications but no atypia.    Excisional biopsy on August 19, 2019 showed atypical ductal hyperplasia.    She started raloxifene in January 2020.    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  Review of Systems   Constitutional: Negative for appetite change and unexpected weight  change.   HENT: Negative for mouth sores.    Eyes: Negative for visual disturbance.   Respiratory: Negative for cough and shortness of breath.    Cardiovascular: Negative for chest pain.   Gastrointestinal: Positive for diarrhea. Negative for abdominal pain.   Genitourinary: Positive for frequency.   Musculoskeletal: Negative for back pain.   Integumentary:  Negative for rash.   Neurological: Negative for headaches.   Hematological: Negative for adenopathy.   Psychiatric/Behavioral: The patient is not nervous/anxious.          Objective:      Physical Exam  Constitutional:       General: She is not in acute distress.     Appearance: Normal appearance.   Neurological:      Mental Status: She is alert.   Psychiatric:         Mood and Affect: Mood normal.         Behavior: Behavior normal.         Thought Content: Thought content normal.         Judgment: Judgment normal.         Assessment:       Problem List Items Addressed This Visit     Atypical ductal hyperplasia of left breast - Primary          Plan:       RTC 6 M with mammogram    Route Chart for Scheduling    Med Onc Chart Routing      Follow up with physician 6 months.   Follow up with [facility]    Labs    Imaging Mammogram      Pharmacy appointment    Other referrals

## 2022-05-31 ENCOUNTER — PATIENT MESSAGE (OUTPATIENT)
Dept: ADMINISTRATIVE | Facility: HOSPITAL | Age: 27
End: 2022-05-31
Payer: COMMERCIAL

## 2023-01-20 ENCOUNTER — OFFICE VISIT (OUTPATIENT)
Dept: INTERNAL MEDICINE | Facility: CLINIC | Age: 28
End: 2023-01-20
Payer: COMMERCIAL

## 2023-01-20 ENCOUNTER — LAB VISIT (OUTPATIENT)
Dept: LAB | Facility: HOSPITAL | Age: 28
End: 2023-01-20
Attending: INTERNAL MEDICINE
Payer: COMMERCIAL

## 2023-01-20 VITALS
DIASTOLIC BLOOD PRESSURE: 70 MMHG | HEART RATE: 95 BPM | BODY MASS INDEX: 29.19 KG/M2 | OXYGEN SATURATION: 100 % | SYSTOLIC BLOOD PRESSURE: 112 MMHG | WEIGHT: 159.63 LBS

## 2023-01-20 DIAGNOSIS — Z00.00 PREVENTATIVE HEALTH CARE: ICD-10-CM

## 2023-01-20 DIAGNOSIS — Z00.00 PREVENTATIVE HEALTH CARE: Primary | ICD-10-CM

## 2023-01-20 DIAGNOSIS — F98.8 ATTENTION DEFICIT DISORDER, UNSPECIFIED HYPERACTIVITY PRESENCE: ICD-10-CM

## 2023-01-20 PROCEDURE — 1159F PR MEDICATION LIST DOCUMENTED IN MEDICAL RECORD: ICD-10-PCS | Mod: CPTII,S$GLB,, | Performed by: INTERNAL MEDICINE

## 2023-01-20 PROCEDURE — 1159F MED LIST DOCD IN RCRD: CPT | Mod: CPTII,S$GLB,, | Performed by: INTERNAL MEDICINE

## 2023-01-20 PROCEDURE — 3078F DIAST BP <80 MM HG: CPT | Mod: CPTII,S$GLB,, | Performed by: INTERNAL MEDICINE

## 2023-01-20 PROCEDURE — 1160F RVW MEDS BY RX/DR IN RCRD: CPT | Mod: CPTII,S$GLB,, | Performed by: INTERNAL MEDICINE

## 2023-01-20 PROCEDURE — 99395 PREV VISIT EST AGE 18-39: CPT | Mod: S$GLB,,, | Performed by: INTERNAL MEDICINE

## 2023-01-20 PROCEDURE — 85025 COMPLETE CBC W/AUTO DIFF WBC: CPT | Performed by: INTERNAL MEDICINE

## 2023-01-20 PROCEDURE — 84443 ASSAY THYROID STIM HORMONE: CPT | Performed by: INTERNAL MEDICINE

## 2023-01-20 PROCEDURE — 80061 LIPID PANEL: CPT | Performed by: INTERNAL MEDICINE

## 2023-01-20 PROCEDURE — 36415 COLL VENOUS BLD VENIPUNCTURE: CPT | Mod: PO | Performed by: INTERNAL MEDICINE

## 2023-01-20 PROCEDURE — 1160F PR REVIEW ALL MEDS BY PRESCRIBER/CLIN PHARMACIST DOCUMENTED: ICD-10-PCS | Mod: CPTII,S$GLB,, | Performed by: INTERNAL MEDICINE

## 2023-01-20 PROCEDURE — 99395 PR PREVENTIVE VISIT,EST,18-39: ICD-10-PCS | Mod: S$GLB,,, | Performed by: INTERNAL MEDICINE

## 2023-01-20 PROCEDURE — 3074F SYST BP LT 130 MM HG: CPT | Mod: CPTII,S$GLB,, | Performed by: INTERNAL MEDICINE

## 2023-01-20 PROCEDURE — 3074F PR MOST RECENT SYSTOLIC BLOOD PRESSURE < 130 MM HG: ICD-10-PCS | Mod: CPTII,S$GLB,, | Performed by: INTERNAL MEDICINE

## 2023-01-20 PROCEDURE — 99999 PR PBB SHADOW E&M-EST. PATIENT-LVL III: ICD-10-PCS | Mod: PBBFAC,,, | Performed by: INTERNAL MEDICINE

## 2023-01-20 PROCEDURE — 3008F BODY MASS INDEX DOCD: CPT | Mod: CPTII,S$GLB,, | Performed by: INTERNAL MEDICINE

## 2023-01-20 PROCEDURE — 80053 COMPREHEN METABOLIC PANEL: CPT | Performed by: INTERNAL MEDICINE

## 2023-01-20 PROCEDURE — 3008F PR BODY MASS INDEX (BMI) DOCUMENTED: ICD-10-PCS | Mod: CPTII,S$GLB,, | Performed by: INTERNAL MEDICINE

## 2023-01-20 PROCEDURE — 99999 PR PBB SHADOW E&M-EST. PATIENT-LVL III: CPT | Mod: PBBFAC,,, | Performed by: INTERNAL MEDICINE

## 2023-01-20 PROCEDURE — 3078F PR MOST RECENT DIASTOLIC BLOOD PRESSURE < 80 MM HG: ICD-10-PCS | Mod: CPTII,S$GLB,, | Performed by: INTERNAL MEDICINE

## 2023-01-20 RX ORDER — DEXTROAMPHETAMINE SACCHARATE, AMPHETAMINE ASPARTATE MONOHYDRATE, DEXTROAMPHETAMINE SULFATE AND AMPHETAMINE SULFATE 7.5; 7.5; 7.5; 7.5 MG/1; MG/1; MG/1; MG/1
CAPSULE, EXTENDED RELEASE ORAL EVERY MORNING
COMMUNITY
Start: 2022-12-02

## 2023-01-20 RX ORDER — DEXTROAMPHETAMINE SACCHARATE, AMPHETAMINE ASPARTATE, DEXTROAMPHETAMINE SULFATE AND AMPHETAMINE SULFATE 2.5; 2.5; 2.5; 2.5 MG/1; MG/1; MG/1; MG/1
TABLET ORAL
COMMUNITY

## 2023-01-21 LAB
ALBUMIN SERPL BCP-MCNC: 4 G/DL (ref 3.5–5.2)
ALP SERPL-CCNC: 63 U/L (ref 55–135)
ALT SERPL W/O P-5'-P-CCNC: 10 U/L (ref 10–44)
ANION GAP SERPL CALC-SCNC: 12 MMOL/L (ref 8–16)
AST SERPL-CCNC: 18 U/L (ref 10–40)
BASOPHILS # BLD AUTO: 0.05 K/UL (ref 0–0.2)
BASOPHILS NFR BLD: 0.6 % (ref 0–1.9)
BILIRUB SERPL-MCNC: 0.5 MG/DL (ref 0.1–1)
BUN SERPL-MCNC: 7 MG/DL (ref 6–20)
CALCIUM SERPL-MCNC: 9.2 MG/DL (ref 8.7–10.5)
CHLORIDE SERPL-SCNC: 101 MMOL/L (ref 95–110)
CHOLEST SERPL-MCNC: 179 MG/DL (ref 120–199)
CHOLEST/HDLC SERPL: 3.1 {RATIO} (ref 2–5)
CO2 SERPL-SCNC: 23 MMOL/L (ref 23–29)
CREAT SERPL-MCNC: 0.7 MG/DL (ref 0.5–1.4)
DIFFERENTIAL METHOD: ABNORMAL
EOSINOPHIL # BLD AUTO: 0.2 K/UL (ref 0–0.5)
EOSINOPHIL NFR BLD: 3.1 % (ref 0–8)
ERYTHROCYTE [DISTWIDTH] IN BLOOD BY AUTOMATED COUNT: 13.5 % (ref 11.5–14.5)
EST. GFR  (NO RACE VARIABLE): >60 ML/MIN/1.73 M^2
GLUCOSE SERPL-MCNC: 87 MG/DL (ref 70–110)
HCT VFR BLD AUTO: 42.6 % (ref 37–48.5)
HDLC SERPL-MCNC: 57 MG/DL (ref 40–75)
HDLC SERPL: 31.8 % (ref 20–50)
HGB BLD-MCNC: 13.5 G/DL (ref 12–16)
IMM GRANULOCYTES # BLD AUTO: 0.01 K/UL (ref 0–0.04)
IMM GRANULOCYTES NFR BLD AUTO: 0.1 % (ref 0–0.5)
LDLC SERPL CALC-MCNC: 100 MG/DL (ref 63–159)
LYMPHOCYTES # BLD AUTO: 1.6 K/UL (ref 1–4.8)
LYMPHOCYTES NFR BLD: 20.3 % (ref 18–48)
MCH RBC QN AUTO: 28 PG (ref 27–31)
MCHC RBC AUTO-ENTMCNC: 31.7 G/DL (ref 32–36)
MCV RBC AUTO: 88 FL (ref 82–98)
MONOCYTES # BLD AUTO: 0.5 K/UL (ref 0.3–1)
MONOCYTES NFR BLD: 6.2 % (ref 4–15)
NEUTROPHILS # BLD AUTO: 5.5 K/UL (ref 1.8–7.7)
NEUTROPHILS NFR BLD: 69.7 % (ref 38–73)
NONHDLC SERPL-MCNC: 122 MG/DL
NRBC BLD-RTO: 0 /100 WBC
PLATELET # BLD AUTO: 355 K/UL (ref 150–450)
PMV BLD AUTO: 10.3 FL (ref 9.2–12.9)
POTASSIUM SERPL-SCNC: 3.9 MMOL/L (ref 3.5–5.1)
PROT SERPL-MCNC: 7.7 G/DL (ref 6–8.4)
RBC # BLD AUTO: 4.83 M/UL (ref 4–5.4)
SODIUM SERPL-SCNC: 136 MMOL/L (ref 136–145)
TRIGL SERPL-MCNC: 110 MG/DL (ref 30–150)
TSH SERPL DL<=0.005 MIU/L-ACNC: 1.44 UIU/ML (ref 0.4–4)
WBC # BLD AUTO: 7.85 K/UL (ref 3.9–12.7)

## 2023-01-25 NOTE — PROGRESS NOTES
Subjective:       Patient ID: Makenna Mcintosh is a 27 y.o. female.    Chief Complaint:  Annual physical    HPI 27-year-old female presents to clinic today for and annual physical patient is without any health complaints today.  Review of Systems    Negative  Objective:      Physical Exam  General: Well-appearing, well-nourished.  No distress  HEENT: conjunctivae are normal.  Pupils are equal and reative to light.  TM's are clear and intact bilaterally.  Hearing is grossly normal.  Nasopharynx is clear.  Oropharynx is clear.  Neck: Supple.  No thyroid megaly.  No bruits.  Lymph: No cervical or supraclavicular adenopathy.  Heart: Regular rate and rhythm, without murmur, rub or gallop.  Lungs: Clear to auscultation; respiratory effort normal.  Abdomen: Soft, nontender, nondistended.  Normoactive bowel sounds.  No hepatomegaly.  No masses.  Extremities: Good distal pulses.  No edema.  Psych: Oriented to time person place.  Judgment and insight seem unimpaired.  Mood and affect are appropriate.  Assessment:       Problem List Items Addressed This Visit       ADD (attention deficit disorder)     Other Visit Diagnoses       Preventative health care    -  Primary    Relevant Orders    CBC Auto Differential (Completed)    Comprehensive Metabolic Panel (Completed)    Lipid Panel (Completed)    TSH (Completed)              Plan:       Diagnoses and all orders for this visit:    Preventative health care  -     CBC Auto Differential; Future  -     Comprehensive Metabolic Panel; Future  -     Lipid Panel; Future  -     TSH; Future  Healthcare maintenance performed, reviewed, updated and counseled today.  Attention deficit disorder, unspecified hyperactivity presence

## 2023-11-13 ENCOUNTER — PATIENT MESSAGE (OUTPATIENT)
Dept: ADMINISTRATIVE | Facility: HOSPITAL | Age: 28
End: 2023-11-13
Payer: COMMERCIAL

## 2023-11-14 ENCOUNTER — PATIENT OUTREACH (OUTPATIENT)
Dept: ADMINISTRATIVE | Facility: HOSPITAL | Age: 28
End: 2023-11-14
Payer: COMMERCIAL

## 2023-11-14 NOTE — PROGRESS NOTES
Non-compliant [facility] report chart review -  11/14/2023  Chart review completed for the following HM test if overdue  (Cervical Cancer Screening)  Care Everywhere and Media reports - updates requested and reviewed.    [facility] and [facility] reviewed. DIS reviewed for test needed. Portal outreached regarding Health maintenance  to schedule PAP

## 2024-05-01 ENCOUNTER — TELEPHONE (OUTPATIENT)
Dept: FAMILY MEDICINE | Facility: CLINIC | Age: 29
End: 2024-05-01
Payer: COMMERCIAL

## 2024-05-01 NOTE — TELEPHONE ENCOUNTER
It appears that patient attempted to schedule an E visit for ADD follow-up.  Patient's last annual physical and checkup was January of 2023.  Additionally, I have not previously prescribed this medication for her.  It appears she was getting it from another provider.  Please contact patient and offered to schedule a wellness physical.

## 2024-05-09 ENCOUNTER — LAB VISIT (OUTPATIENT)
Dept: LAB | Facility: HOSPITAL | Age: 29
End: 2024-05-09
Attending: STUDENT IN AN ORGANIZED HEALTH CARE EDUCATION/TRAINING PROGRAM
Payer: COMMERCIAL

## 2024-05-09 ENCOUNTER — OFFICE VISIT (OUTPATIENT)
Dept: FAMILY MEDICINE | Facility: CLINIC | Age: 29
End: 2024-05-09
Payer: COMMERCIAL

## 2024-05-09 VITALS
WEIGHT: 175.13 LBS | DIASTOLIC BLOOD PRESSURE: 81 MMHG | RESPIRATION RATE: 18 BRPM | SYSTOLIC BLOOD PRESSURE: 112 MMHG | HEART RATE: 89 BPM | OXYGEN SATURATION: 98 % | HEIGHT: 62 IN | BODY MASS INDEX: 32.23 KG/M2 | TEMPERATURE: 99 F

## 2024-05-09 DIAGNOSIS — K64.9 HEMORRHOIDS, UNSPECIFIED HEMORRHOID TYPE: ICD-10-CM

## 2024-05-09 DIAGNOSIS — F90.2 ATTENTION DEFICIT HYPERACTIVITY DISORDER (ADHD), COMBINED TYPE: Primary | ICD-10-CM

## 2024-05-09 DIAGNOSIS — Z11.59 NEED FOR HEPATITIS C SCREENING TEST: ICD-10-CM

## 2024-05-09 DIAGNOSIS — N92.6 IRREGULAR PERIODS/MENSTRUAL CYCLES: ICD-10-CM

## 2024-05-09 DIAGNOSIS — E66.9 OBESITY (BMI 30.0-34.9): ICD-10-CM

## 2024-05-09 DIAGNOSIS — Z00.00 ANNUAL PHYSICAL EXAM: ICD-10-CM

## 2024-05-09 DIAGNOSIS — Z83.719 FAMILY HISTORY OF COLONIC POLYPS: ICD-10-CM

## 2024-05-09 DIAGNOSIS — F41.9 ANXIETY: ICD-10-CM

## 2024-05-09 PROBLEM — E66.811 OBESITY (BMI 30.0-34.9): Status: ACTIVE | Noted: 2024-05-09

## 2024-05-09 LAB
ALBUMIN SERPL BCP-MCNC: 4 G/DL (ref 3.5–5.2)
ALP SERPL-CCNC: 63 U/L (ref 55–135)
ALT SERPL W/O P-5'-P-CCNC: 19 U/L (ref 10–44)
ANION GAP SERPL CALC-SCNC: 14 MMOL/L (ref 8–16)
AST SERPL-CCNC: 21 U/L (ref 10–40)
B-HCG UR QL: NEGATIVE
BASOPHILS # BLD AUTO: 0.04 K/UL (ref 0–0.2)
BASOPHILS NFR BLD: 0.5 % (ref 0–1.9)
BILIRUB SERPL-MCNC: 0.4 MG/DL (ref 0.1–1)
BUN SERPL-MCNC: 8 MG/DL (ref 6–20)
CALCIUM SERPL-MCNC: 9.2 MG/DL (ref 8.7–10.5)
CHLORIDE SERPL-SCNC: 102 MMOL/L (ref 95–110)
CHOLEST SERPL-MCNC: 195 MG/DL (ref 120–199)
CHOLEST/HDLC SERPL: 4.8 {RATIO} (ref 2–5)
CO2 SERPL-SCNC: 21 MMOL/L (ref 23–29)
CREAT SERPL-MCNC: 0.8 MG/DL (ref 0.5–1.4)
DIFFERENTIAL METHOD BLD: NORMAL
EOSINOPHIL # BLD AUTO: 0.3 K/UL (ref 0–0.5)
EOSINOPHIL NFR BLD: 3.4 % (ref 0–8)
ERYTHROCYTE [DISTWIDTH] IN BLOOD BY AUTOMATED COUNT: 12.7 % (ref 11.5–14.5)
EST. GFR  (NO RACE VARIABLE): >60 ML/MIN/1.73 M^2
ESTIMATED AVG GLUCOSE: 91 MG/DL (ref 68–131)
GLUCOSE SERPL-MCNC: 64 MG/DL (ref 70–110)
HBA1C MFR BLD: 4.8 % (ref 4–5.6)
HCT VFR BLD AUTO: 40.4 % (ref 37–48.5)
HCV AB SERPL QL IA: NORMAL
HDLC SERPL-MCNC: 41 MG/DL (ref 40–75)
HDLC SERPL: 21 % (ref 20–50)
HGB BLD-MCNC: 13.1 G/DL (ref 12–16)
IMM GRANULOCYTES # BLD AUTO: 0.01 K/UL (ref 0–0.04)
IMM GRANULOCYTES NFR BLD AUTO: 0.1 % (ref 0–0.5)
LDLC SERPL CALC-MCNC: 117.6 MG/DL (ref 63–159)
LYMPHOCYTES # BLD AUTO: 1.8 K/UL (ref 1–4.8)
LYMPHOCYTES NFR BLD: 24.6 % (ref 18–48)
MCH RBC QN AUTO: 28.3 PG (ref 27–31)
MCHC RBC AUTO-ENTMCNC: 32.4 G/DL (ref 32–36)
MCV RBC AUTO: 87 FL (ref 82–98)
MONOCYTES # BLD AUTO: 0.5 K/UL (ref 0.3–1)
MONOCYTES NFR BLD: 7.3 % (ref 4–15)
NEUTROPHILS # BLD AUTO: 4.7 K/UL (ref 1.8–7.7)
NEUTROPHILS NFR BLD: 64.1 % (ref 38–73)
NONHDLC SERPL-MCNC: 154 MG/DL
NRBC BLD-RTO: 0 /100 WBC
PLATELET # BLD AUTO: 398 K/UL (ref 150–450)
PMV BLD AUTO: 10 FL (ref 9.2–12.9)
POTASSIUM SERPL-SCNC: 3.6 MMOL/L (ref 3.5–5.1)
PROT SERPL-MCNC: 7.7 G/DL (ref 6–8.4)
RBC # BLD AUTO: 4.63 M/UL (ref 4–5.4)
SODIUM SERPL-SCNC: 137 MMOL/L (ref 136–145)
TRIGL SERPL-MCNC: 182 MG/DL (ref 30–150)
TSH SERPL DL<=0.005 MIU/L-ACNC: 0.61 UIU/ML (ref 0.4–4)
WBC # BLD AUTO: 7.4 K/UL (ref 3.9–12.7)

## 2024-05-09 PROCEDURE — 83036 HEMOGLOBIN GLYCOSYLATED A1C: CPT | Performed by: STUDENT IN AN ORGANIZED HEALTH CARE EDUCATION/TRAINING PROGRAM

## 2024-05-09 PROCEDURE — 80053 COMPREHEN METABOLIC PANEL: CPT | Performed by: STUDENT IN AN ORGANIZED HEALTH CARE EDUCATION/TRAINING PROGRAM

## 2024-05-09 PROCEDURE — 99999 PR PBB SHADOW E&M-EST. PATIENT-LVL V: CPT | Mod: PBBFAC,,, | Performed by: STUDENT IN AN ORGANIZED HEALTH CARE EDUCATION/TRAINING PROGRAM

## 2024-05-09 PROCEDURE — 3074F SYST BP LT 130 MM HG: CPT | Mod: CPTII,S$GLB,, | Performed by: STUDENT IN AN ORGANIZED HEALTH CARE EDUCATION/TRAINING PROGRAM

## 2024-05-09 PROCEDURE — 36415 COLL VENOUS BLD VENIPUNCTURE: CPT | Mod: PO | Performed by: STUDENT IN AN ORGANIZED HEALTH CARE EDUCATION/TRAINING PROGRAM

## 2024-05-09 PROCEDURE — 1159F MED LIST DOCD IN RCRD: CPT | Mod: CPTII,S$GLB,, | Performed by: STUDENT IN AN ORGANIZED HEALTH CARE EDUCATION/TRAINING PROGRAM

## 2024-05-09 PROCEDURE — 84443 ASSAY THYROID STIM HORMONE: CPT | Performed by: STUDENT IN AN ORGANIZED HEALTH CARE EDUCATION/TRAINING PROGRAM

## 2024-05-09 PROCEDURE — 99385 PREV VISIT NEW AGE 18-39: CPT | Mod: S$GLB,,, | Performed by: STUDENT IN AN ORGANIZED HEALTH CARE EDUCATION/TRAINING PROGRAM

## 2024-05-09 PROCEDURE — 85025 COMPLETE CBC W/AUTO DIFF WBC: CPT | Performed by: STUDENT IN AN ORGANIZED HEALTH CARE EDUCATION/TRAINING PROGRAM

## 2024-05-09 PROCEDURE — 86803 HEPATITIS C AB TEST: CPT | Performed by: STUDENT IN AN ORGANIZED HEALTH CARE EDUCATION/TRAINING PROGRAM

## 2024-05-09 PROCEDURE — 3079F DIAST BP 80-89 MM HG: CPT | Mod: CPTII,S$GLB,, | Performed by: STUDENT IN AN ORGANIZED HEALTH CARE EDUCATION/TRAINING PROGRAM

## 2024-05-09 PROCEDURE — 1160F RVW MEDS BY RX/DR IN RCRD: CPT | Mod: CPTII,S$GLB,, | Performed by: STUDENT IN AN ORGANIZED HEALTH CARE EDUCATION/TRAINING PROGRAM

## 2024-05-09 PROCEDURE — 81025 URINE PREGNANCY TEST: CPT | Mod: PO | Performed by: STUDENT IN AN ORGANIZED HEALTH CARE EDUCATION/TRAINING PROGRAM

## 2024-05-09 PROCEDURE — 80061 LIPID PANEL: CPT | Performed by: STUDENT IN AN ORGANIZED HEALTH CARE EDUCATION/TRAINING PROGRAM

## 2024-05-09 PROCEDURE — 3008F BODY MASS INDEX DOCD: CPT | Mod: CPTII,S$GLB,, | Performed by: STUDENT IN AN ORGANIZED HEALTH CARE EDUCATION/TRAINING PROGRAM

## 2024-05-09 RX ORDER — DEXTROAMPHETAMINE SACCHARATE, AMPHETAMINE ASPARTATE MONOHYDRATE, DEXTROAMPHETAMINE SULFATE AND AMPHETAMINE SULFATE 7.5; 7.5; 7.5; 7.5 MG/1; MG/1; MG/1; MG/1
30 CAPSULE, EXTENDED RELEASE ORAL EVERY MORNING
Qty: 30 CAPSULE | Refills: 0 | Status: SHIPPED | OUTPATIENT
Start: 2024-05-09 | End: 2024-05-10 | Stop reason: SDUPTHER

## 2024-05-09 RX ORDER — DEXTROAMPHETAMINE SACCHARATE, AMPHETAMINE ASPARTATE, DEXTROAMPHETAMINE SULFATE AND AMPHETAMINE SULFATE 2.5; 2.5; 2.5; 2.5 MG/1; MG/1; MG/1; MG/1
1 TABLET ORAL DAILY
Qty: 30 TABLET | Refills: 0 | Status: SHIPPED | OUTPATIENT
Start: 2024-05-09 | End: 2024-06-08

## 2024-05-09 RX ORDER — DEXTROAMPHETAMINE SACCHARATE, AMPHETAMINE ASPARTATE MONOHYDRATE, DEXTROAMPHETAMINE SULFATE AND AMPHETAMINE SULFATE 7.5; 7.5; 7.5; 7.5 MG/1; MG/1; MG/1; MG/1
30 CAPSULE, EXTENDED RELEASE ORAL EVERY MORNING
Qty: 30 CAPSULE | Refills: 0 | Status: SHIPPED | OUTPATIENT
Start: 2024-07-08 | End: 2024-08-07

## 2024-05-09 RX ORDER — DEXTROAMPHETAMINE SACCHARATE, AMPHETAMINE ASPARTATE, DEXTROAMPHETAMINE SULFATE AND AMPHETAMINE SULFATE 2.5; 2.5; 2.5; 2.5 MG/1; MG/1; MG/1; MG/1
1 TABLET ORAL DAILY
Qty: 30 TABLET | Refills: 0 | Status: SHIPPED | OUTPATIENT
Start: 2024-06-08 | End: 2024-07-08

## 2024-05-09 RX ORDER — DEXTROAMPHETAMINE SACCHARATE, AMPHETAMINE ASPARTATE MONOHYDRATE, DEXTROAMPHETAMINE SULFATE AND AMPHETAMINE SULFATE 7.5; 7.5; 7.5; 7.5 MG/1; MG/1; MG/1; MG/1
30 CAPSULE, EXTENDED RELEASE ORAL EVERY MORNING
Qty: 30 CAPSULE | Refills: 0 | Status: SHIPPED | OUTPATIENT
Start: 2024-06-08 | End: 2024-07-08

## 2024-05-09 NOTE — PROGRESS NOTES
Problem List Items Addressed This Visit          Psychiatric    Anxiety    Overview     Chronic history; reports well controlled with add control (see plan)  Denies SI/HI; no hallucinations            Attention deficit hyperactivity disorder (ADHD), combined type - Primary    Overview     Chronic hx; stable on current medication adderall 30mg xr, adderall 10mg ir    Denies mood instability, irritability, aggression, palpitations, chest pain, weight loss, decreased appetite, disordered sleep  Pt is demonstrating no behaviors to suggest inappropriate use of prescribed medications.    [place] Board of Pharmacy Controlled Prescription Drug Monitoring database was queried and showed no activity to suggest abuse, diversion, or other inappropriate use of prescription medications.  VV 3 months            Relevant Medications    dextroamphetamine-amphetamine (ADDERALL XR) 30 MG 24 hr capsule    dextroamphetamine-amphetamine (ADDERALL XR) 30 MG 24 hr capsule (Start on 6/8/2024)    dextroamphetamine-amphetamine (ADDERALL XR) 30 MG 24 hr capsule (Start on 7/8/2024)    dextroamphetamine-amphetamine (ADDERALL) 10 mg Tab    dextroamphetamine-amphetamine (ADDERALL) 10 mg Tab (Start on 6/8/2024)       Endocrine    Obesity (BMI 30.0-34.9)    Overview     Wt Readings from Last 3 Encounters:   05/09/24 1305 79.4 kg (175 lb 1.6 oz)   10/03/23 1446 71.4 kg (157 lb 6.4 oz)   01/20/23 1252 72.4 kg (159 lb 9.8 oz)           General weight loss/lifestyle modification strategies discussed: limit sugary drinks, exercise 3-5x per week  Informal exercise measures discussed, e.g. taking stairs instead of elevator.                    GI    Family history of colonic polyps    Overview     Mom with multiple colonic polyps (unknown pathology)  - c scope         Relevant Orders    Ambulatory referral/consult to Endo Procedure     Hemorrhoids    Overview     Intermittent brbpr, constipation treatment  - c scope          Relevant Orders     Ambulatory referral/consult to Endo Procedure        Other    Annual physical exam    Overview     Complete history and physical was completed today.    Complete and thorough medication reconciliation was performed. Discussed risks and benefits of medications.    Advised patient on orders and health maintenance.    Continue current medications listed on your summary sheet.    All questions were answered.   Follow up as planned or prn            Relevant Orders    TSH    Lipid Panel    Hemoglobin A1C    Comprehensive Metabolic Panel    CBC Auto Differential     Other Visit Diagnoses       Need for hepatitis C screening test        Relevant Orders    Hepatitis C Antibody    Irregular periods/menstrual cycles        Relevant Orders    Pregnancy, urine rapid (Completed)              Patient ID: Makenna Mcintosh is a 28 y.o. female.    Chief Complaint:  establish care    Patient is here to establish care.     Pt diagnosed with ADD/ADHD 2020. Has tried adderall. Doing well on current medication adderall 30mg xr mad prn adderall 10 ir . This medication helps to control lack of focus. Denies mood instability, irritability, aggression, palpitations, chest pain, weight loss, decreased appetite, disordered sleep.       History:  OBGYN: agena      LMP: Patient's last menstrual period was 04/24/2024 (approximate).   MMG: n/a    PAP: due, upcoming appt   Colonoscopy: No personal history of colon cancer, melena, crohn's, ulcerative colitis; No family history of colon cancer. Mom with hx of colonic polyps. Pt reports intermittent abd pain, brbpr hx, most recently few months ago. Flares with certain foods, but she isnt sure which foods. Reports soft bm q2-3d.       Health Maintenance Topics with due status: Not Due       Topic Last Completion Date    TETANUS VACCINE 02/05/2019    Influenza Vaccine 09/13/2019        ==============================================  History reviewed.   Health Maintenance Due   Topic Date  Due    Hepatitis C Screening  Never done    Cervical Cancer Screening  09/20/2019    COVID-19 Vaccine (7 - 2023-24 season) 09/01/2023       Past Medical History:  Past Medical History:   Diagnosis Date    ADHD     Annual physical exam 5/9/2024    Anxiety     Femoral hernia of left side     Human papilloma virus (HPV) type 9 vaccine administered 2006, 2018    2 of 3 Received     Insomnia     history of    Ovarian torsion     PONV (postoperative nausea and vomiting)     Unilateral inguinal hernia, without obstruction or gangrene, not specified as recurrent     LEFT    Uses condoms for birth control      Past Surgical History:   Procedure Laterality Date    ADENOIDECTOMY      PELVIC LAPAROSCOPY  08/04/2016    Right Ovarian Teratoma  ([facility])    VAGINAL DELIVERY  04/20/2019    Girl    WISDOM TOOTH EXTRACTION       Review of patient's allergies indicates:   Allergen Reactions    Bactrim [sulfamethoxazole-trimethoprim] Hives    Amoxicillin Nausea And Vomiting     Current Outpatient Medications on File Prior to Visit   Medication Sig Dispense Refill    fexofenadine HCl (ALLEGRA ALLERGY ORAL) Take 1 tablet by mouth as needed.      [DISCONTINUED] dextroamphetamine-amphetamine (ADDERALL XR) 30 MG 24 hr capsule Take by mouth every morning.      [DISCONTINUED] dextroamphetamine-amphetamine 10 mg Tab        No current facility-administered medications on file prior to visit.     Social History     Socioeconomic History    Marital status:    Occupational History    Occupation: opthalmology clinic scribe    Tobacco Use    Smoking status: Never    Smokeless tobacco: Never   Substance and Sexual Activity    Alcohol use: Yes     Comment: Social    Drug use: No    Sexual activity: Yes     Partners: Male     Birth control/protection: None     Comment: :       Social Determinants of Health     Financial Resource Strain: Low Risk  (5/6/2024)    Overall Financial Resource Strain (CARDIA)     Difficulty of Paying Living  Expenses: Not hard at all   Food Insecurity: No Food Insecurity (5/6/2024)    Hunger Vital Sign     Worried About Running Out of Food in the Last Year: Never true     Ran Out of Food in the Last Year: Never true   Transportation Needs: No Transportation Needs (5/6/2024)    PRAPARE - Transportation     Lack of Transportation (Medical): No     Lack of Transportation (Non-Medical): No   Physical Activity: Insufficiently Active (5/6/2024)    Exercise Vital Sign     Days of Exercise per Week: 4 days     Minutes of Exercise per Session: 30 min   Stress: No Stress Concern Present (5/6/2024)    [place] [employer] - Occupational Stress Questionnaire     Feeling of Stress : Only a little   Housing Stability: Unknown (5/6/2024)    Housing Stability Vital Sign     Unable to Pay for Housing in the Last Year: No     Family History   Problem Relation Name Age of Onset    Diabetes Mother      Hypertension Mother      Diabetes Father      Hypertension Father      Lung cancer Maternal Grandmother      Breast cancer Neg Hx      Colon cancer Neg Hx      Ovarian cancer Neg Hx      Cancer Neg Hx            Review of Systems   12 point review of systems per hpi, otherwise negative         Objective:    Nursing note and vitals reviewed.  Vitals:    05/09/24 1305   BP: 112/81   Pulse: 89   Resp: 18   Temp: 98.5 °F (36.9 °C)     Body mass index is 32.03 kg/m².     Physical Exam   Constitutional: SHE is oriented to person, place, and time. She appears well-developed and well-nourished. No distress.   HENT: WNL  Head: Normocephalic and atraumatic.   Eyes: Pupils are equal, round, and reactive to light. EOM are normal.   Neck: Normal range of motion. Neck supple.   Cardiovascular: Normal rate, regular rhythm, normal heart sounds and intact distal pulses.   No murmur heard.  Pulmonary/Chest: Effort normal and breath sounds normal. No respiratory distress. She has no wheezes.   GI: soft, non distended, no ttp, no  rebound/guarding  Musculoskeletal: Normal range of motion. She exhibits no edema.   Neurological: She is alert and oriented to person, place, and time. No cranial nerve deficit.   Skin: Skin is warm and dry. Capillary refill takes less than 2 seconds.   Psychiatric: She has a normal mood and affect. Her behavior is normal.           Brenda Lo MD    We Offer Telehealth & Same Day Appointments!   Book your Telehealth appointment with me through my nurse or   Clinic appointments on [address] [facility]!  [phone]     To Schedule appointments online, go to [facility]: [email or web address]

## 2024-05-10 ENCOUNTER — PATIENT MESSAGE (OUTPATIENT)
Dept: FAMILY MEDICINE | Facility: CLINIC | Age: 29
End: 2024-05-10
Payer: COMMERCIAL

## 2024-05-10 DIAGNOSIS — F90.2 ATTENTION DEFICIT HYPERACTIVITY DISORDER (ADHD), COMBINED TYPE: ICD-10-CM

## 2024-05-10 RX ORDER — DEXTROAMPHETAMINE SACCHARATE, AMPHETAMINE ASPARTATE MONOHYDRATE, DEXTROAMPHETAMINE SULFATE AND AMPHETAMINE SULFATE 7.5; 7.5; 7.5; 7.5 MG/1; MG/1; MG/1; MG/1
30 CAPSULE, EXTENDED RELEASE ORAL EVERY MORNING
Qty: 30 CAPSULE | Refills: 0 | Status: SHIPPED | OUTPATIENT
Start: 2024-05-10 | End: 2024-06-17 | Stop reason: SDUPTHER

## 2024-05-10 NOTE — PROGRESS NOTES
6m lipid          Dear Mrs.Jane Stephan Mcintosh       I have reviewed your recent blood work:     - Your Hepatitis C screening normal (patients are recommended to be screened at least once in their lifetime)     - Your complete blood count is normal     - Your metabolic panel which shows your electrolytes, glucose, kidney function, and liver function is ok (low glucose, likely from fasting for several hrs. No concern at this time)    - A1c is normal (labs to screen for prediabetes and diabetes)     - Thyroid function is normal     Your cholesterol is normal; however, your triglycerides (a component of cholesterol) are mildly elevated. You can take OTC (over the counter) omega 3 fatty acid/ fish oil tabs. Continue to limit fatty foods; participate in regular exercise. We will recheck in 6-12 months.       Please do not hesitate to call or message with any additional questions or concerns.  Brenda Lo MD

## 2024-05-10 NOTE — TELEPHONE ENCOUNTER
No orders of the defined types were placed in this encounter.      Medications Ordered This Encounter   Medications    dextroamphetamine-amphetamine (ADDERALL XR) 30 MG 24 hr capsule     Sig: Take 1 capsule (30 mg total) by mouth every morning.     Dispense:  30 capsule     Refill:  0

## 2024-05-13 ENCOUNTER — HOSPITAL ENCOUNTER (OUTPATIENT)
Dept: PREADMISSION TESTING | Facility: HOSPITAL | Age: 29
Discharge: HOME | End: 2024-05-13
Attending: INTERNAL MEDICINE
Payer: COMMERCIAL

## 2024-05-13 DIAGNOSIS — Z83.719 FAMILY HISTORY OF COLONIC POLYPS: Primary | ICD-10-CM

## 2024-05-13 DIAGNOSIS — K64.9 HEMORRHOIDS, UNSPECIFIED HEMORRHOID TYPE: ICD-10-CM

## 2024-05-13 RX ORDER — SODIUM, POTASSIUM,MAG SULFATES 17.5-3.13G
1 SOLUTION, RECONSTITUTED, ORAL ORAL DAILY
Qty: 1 KIT | Refills: 0 | Status: SHIPPED | OUTPATIENT
Start: 2024-05-13 | End: 2024-05-15

## 2024-05-22 ENCOUNTER — PATIENT MESSAGE (OUTPATIENT)
Dept: FAMILY MEDICINE | Facility: CLINIC | Age: 29
End: 2024-05-22
Payer: COMMERCIAL

## 2024-06-14 ENCOUNTER — TELEPHONE (OUTPATIENT)
Dept: OBSTETRICS AND GYNECOLOGY | Facility: CLINIC | Age: 29
End: 2024-06-14

## 2024-06-14 ENCOUNTER — OFFICE VISIT (OUTPATIENT)
Dept: OBSTETRICS AND GYNECOLOGY | Facility: CLINIC | Age: 29
End: 2024-06-14
Payer: COMMERCIAL

## 2024-06-14 ENCOUNTER — LAB VISIT (OUTPATIENT)
Dept: LAB | Facility: HOSPITAL | Age: 29
End: 2024-06-14
Attending: STUDENT IN AN ORGANIZED HEALTH CARE EDUCATION/TRAINING PROGRAM
Payer: COMMERCIAL

## 2024-06-14 VITALS
SYSTOLIC BLOOD PRESSURE: 124 MMHG | DIASTOLIC BLOOD PRESSURE: 84 MMHG | WEIGHT: 173.31 LBS | BODY MASS INDEX: 31.69 KG/M2

## 2024-06-14 DIAGNOSIS — Z12.4 SCREENING FOR CERVICAL CANCER: ICD-10-CM

## 2024-06-14 DIAGNOSIS — N91.2 ABSENT MENSES: Primary | ICD-10-CM

## 2024-06-14 DIAGNOSIS — O21.9 NAUSEA/VOMITING IN PREGNANCY: ICD-10-CM

## 2024-06-14 DIAGNOSIS — N91.2 ABSENT MENSES: ICD-10-CM

## 2024-06-14 LAB
ABO + RH BLD: NORMAL
B-HCG UR QL: POSITIVE
BASOPHILS # BLD AUTO: 0.03 K/UL (ref 0–0.2)
BASOPHILS NFR BLD: 0.5 % (ref 0–1.9)
BLD GP AB SCN CELLS X3 SERPL QL: NORMAL
CTP QC/QA: YES
DIFFERENTIAL METHOD BLD: NORMAL
EOSINOPHIL # BLD AUTO: 0.1 K/UL (ref 0–0.5)
EOSINOPHIL NFR BLD: 1.5 % (ref 0–8)
ERYTHROCYTE [DISTWIDTH] IN BLOOD BY AUTOMATED COUNT: 13 % (ref 11.5–14.5)
HBV SURFACE AG SERPL QL IA: NORMAL
HCT VFR BLD AUTO: 39 % (ref 37–48.5)
HCV AB SERPL QL IA: NORMAL
HGB BLD-MCNC: 12.9 G/DL (ref 12–16)
HIV 1+2 AB+HIV1 P24 AG SERPL QL IA: NORMAL
IMM GRANULOCYTES # BLD AUTO: 0.01 K/UL (ref 0–0.04)
IMM GRANULOCYTES NFR BLD AUTO: 0.2 % (ref 0–0.5)
LYMPHOCYTES # BLD AUTO: 1.3 K/UL (ref 1–4.8)
LYMPHOCYTES NFR BLD: 19.8 % (ref 18–48)
MCH RBC QN AUTO: 27.4 PG (ref 27–31)
MCHC RBC AUTO-ENTMCNC: 33.1 G/DL (ref 32–36)
MCV RBC AUTO: 83 FL (ref 82–98)
MONOCYTES # BLD AUTO: 0.4 K/UL (ref 0.3–1)
MONOCYTES NFR BLD: 6.8 % (ref 4–15)
NEUTROPHILS # BLD AUTO: 4.6 K/UL (ref 1.8–7.7)
NEUTROPHILS NFR BLD: 71.2 % (ref 38–73)
NRBC BLD-RTO: 0 /100 WBC
PLATELET # BLD AUTO: 380 K/UL (ref 150–450)
PMV BLD AUTO: 10.4 FL (ref 9.2–12.9)
RBC # BLD AUTO: 4.71 M/UL (ref 4–5.4)
TREPONEMA PALLIDUM IGG+IGM AB [PRESENCE] IN SERUM OR PLASMA BY IMMUNOASSAY: NONREACTIVE
WBC # BLD AUTO: 6.51 K/UL (ref 3.9–12.7)

## 2024-06-14 PROCEDURE — 36415 COLL VENOUS BLD VENIPUNCTURE: CPT | Mod: PN | Performed by: STUDENT IN AN ORGANIZED HEALTH CARE EDUCATION/TRAINING PROGRAM

## 2024-06-14 PROCEDURE — 87389 HIV-1 AG W/HIV-1&-2 AB AG IA: CPT | Performed by: STUDENT IN AN ORGANIZED HEALTH CARE EDUCATION/TRAINING PROGRAM

## 2024-06-14 PROCEDURE — 87340 HEPATITIS B SURFACE AG IA: CPT | Performed by: STUDENT IN AN ORGANIZED HEALTH CARE EDUCATION/TRAINING PROGRAM

## 2024-06-14 PROCEDURE — 99999 PR PBB SHADOW E&M-EST. PATIENT-LVL III: CPT | Mod: PBBFAC,,, | Performed by: STUDENT IN AN ORGANIZED HEALTH CARE EDUCATION/TRAINING PROGRAM

## 2024-06-14 PROCEDURE — 86850 RBC ANTIBODY SCREEN: CPT | Performed by: STUDENT IN AN ORGANIZED HEALTH CARE EDUCATION/TRAINING PROGRAM

## 2024-06-14 PROCEDURE — 86787 VARICELLA-ZOSTER ANTIBODY: CPT | Performed by: STUDENT IN AN ORGANIZED HEALTH CARE EDUCATION/TRAINING PROGRAM

## 2024-06-14 PROCEDURE — 86762 RUBELLA ANTIBODY: CPT | Performed by: STUDENT IN AN ORGANIZED HEALTH CARE EDUCATION/TRAINING PROGRAM

## 2024-06-14 PROCEDURE — 86593 SYPHILIS TEST NON-TREP QUANT: CPT | Performed by: STUDENT IN AN ORGANIZED HEALTH CARE EDUCATION/TRAINING PROGRAM

## 2024-06-14 PROCEDURE — 85025 COMPLETE CBC W/AUTO DIFF WBC: CPT | Performed by: STUDENT IN AN ORGANIZED HEALTH CARE EDUCATION/TRAINING PROGRAM

## 2024-06-14 PROCEDURE — 86900 BLOOD TYPING SEROLOGIC ABO: CPT | Performed by: STUDENT IN AN ORGANIZED HEALTH CARE EDUCATION/TRAINING PROGRAM

## 2024-06-14 PROCEDURE — 86803 HEPATITIS C AB TEST: CPT | Performed by: STUDENT IN AN ORGANIZED HEALTH CARE EDUCATION/TRAINING PROGRAM

## 2024-06-14 RX ORDER — DOXYLAMINE SUCCINATE AND PYRIDOXINE HYDROCHLORIDE 20; 20 MG/1; MG/1
1 TABLET, EXTENDED RELEASE ORAL 2 TIMES DAILY
Qty: 60 TABLET | Refills: 3 | Status: SHIPPED | OUTPATIENT
Start: 2024-06-14 | End: 2024-07-14

## 2024-06-14 RX ORDER — ONDANSETRON 4 MG/1
4 TABLET, ORALLY DISINTEGRATING ORAL EVERY 6 HOURS PRN
Qty: 60 TABLET | Refills: 3 | Status: SHIPPED | OUTPATIENT
Start: 2024-06-14

## 2024-06-14 RX ORDER — DOXYLAMINE SUCCINATE AND PYRIDOXINE HYDROCHLORIDE, DELAYED RELEASE TABLETS 10 MG/10 MG 10; 10 MG/1; MG/1
2 TABLET, DELAYED RELEASE ORAL NIGHTLY
Qty: 60 TABLET | Refills: 3 | Status: SHIPPED | OUTPATIENT
Start: 2024-06-14

## 2024-06-14 NOTE — TELEPHONE ENCOUNTER
----- Message from Zakia Davila LPN sent at 6/14/2024 11:08 AM CDT -----    ----- Message -----  From: Kandy Vo  Sent: 6/14/2024  10:55 AM CDT  To: [facility] Staff    Type:  Pharmacy Calling to Clarify an RX    Name of Caller:  Evon    Pharmacy Name:  [facility]    Prescription Name:  doxylamine-pyridoxine, vit B6, (BONJESTA) 20-20 mg TbID    What do they need to clarify?:  Pt's co pay is $700. Trying to see if Dr would write a prescription for generic of diclegis 10-10mg take twice daily, so it is cheaper for pt.     Best Call Back Number:  Phone: [phone] Fax: [phone]    Additional Information:    Please call back to advise. Thanks!

## 2024-06-14 NOTE — PROGRESS NOTES
CC: Absence of menses    Makenna Mcintosh is a 28 y.o. female  presents with complaint of absence of menstruation.  She denies nausea/vomIting/abdominal pain/bleeding.  UPT is positive.     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      No hx of abnormal pap smear     Past Medical History:   Diagnosis Date    ADHD     Annual physical exam 2024    Anxiety     Femoral hernia of left side     Human papilloma virus (HPV) type 9 vaccine administered ,     2 of 3 Received     Insomnia     history of    Ovarian torsion     PONV (postoperative nausea and vomiting)     Unilateral inguinal hernia, without obstruction or gangrene, not specified as recurrent     LEFT    Uses condoms for birth control      Past Surgical History:   Procedure Laterality Date    ADENOIDECTOMY      PELVIC LAPAROSCOPY  2016    Right Ovarian Teratoma  ([facility])    VAGINAL DELIVERY  2019    Girl    WISDOM TOOTH EXTRACTION       Social History     Socioeconomic History    Marital status:    Occupational History    Occupation: opthalmology clinic scribe    Tobacco Use    Smoking status: Never    Smokeless tobacco: Never   Substance and Sexual Activity    Alcohol use: Yes     Comment: Social    Drug use: No    Sexual activity: Yes     Partners: Male     Birth control/protection: None     Comment: :       Social Determinants of Health     Financial Resource Strain: Low Risk  (2024)    Overall Financial Resource Strain (CARDIA)     Difficulty of Paying Living Expenses: Not hard at all   Food Insecurity: No Food Insecurity (2024)    Hunger Vital Sign     Worried About Running Out of Food in the Last Year: Never  true     Ran Out of Food in the Last Year: Never true   Transportation Needs: No Transportation Needs (2024)    PRAPARE - Transportation     Lack of Transportation (Medical): No     Lack of Transportation (Non-Medical): No   Physical Activity: Insufficiently Active (2024)    Exercise Vital Sign     Days of Exercise per Week: 4 days     Minutes of Exercise per Session: 30 min   Stress: No Stress Concern Present (2024)    [employer] - Occupational Stress Questionnaire     Feeling of Stress : Only a little   Housing Stability: Unknown (2024)    Housing Stability Vital Sign     Unable to Pay for Housing in the Last Year: No     Family History   Problem Relation Name Age of Onset    Lung cancer Maternal Grandmother      Diabetes Father      Hypertension Father      Diabetes Mother      Hypertension Mother      Breast cancer Neg Hx      Colon cancer Neg Hx      Ovarian cancer Neg Hx      Cancer Neg Hx      Uterine cancer Neg Hx       OB History    Para Term  AB Living   2 1 1 0 0 1   SAB IAB Ectopic Multiple Live Births   0 0 0 0 1      # Outcome Date GA Lbr Holger/2nd Weight Sex Type Anes PTL Lv   2 Term 19 39w5d  3.2 kg (7 lb 0.9 oz) F Vag-Spont EPI N IVETTE   1                Obstetric Comments   Menarche ~ 10       /84   Wt 78.6 kg (173 lb 4.5 oz)   LMP 2024   BMI 31.69 kg/m²     ROS:  GENERAL: Denies weight gain or weight loss. Feeling well overall.   SKIN: Denies rash or lesions.   HEAD: Denies head injury or headache.   NODES: Denies enlarged lymph nodes.   CHEST: Denies chest pain or shortness of breath.   CARDIOVASCULAR: Denies palpitations or left sided chest pain.   ABDOMEN: No abdominal pain, constipation, diarrhea, nausea, vomiting or rectal bleeding.   URINARY: No frequency, dysuria, hematuria, or burning on urination.  REPRODUCTIVE: See HPI.   HEMATOLOGIC: No easy bruisability or excessive bleeding.   MUSCULOSKELETAL: Denies  joint pain or swelling.   NEUROLOGIC: Denies syncope or weakness.   PSYCHIATRIC: Denies depression, anxiety or mood swings.    PE:   APPEARANCE: Well nourished, well developed, in no acute distress.  AFFECT: WNL, alert and oriented x 3.  SKIN: No acne or hirsutism.  NECK: Neck symmetric without masses or thyromegaly.  NODES: No inguinal, cervical, axillary or femoral lymph node enlargement.  CHEST: Good respiratory effort.   ABDOMEN: Soft. No tenderness or masses. No hepatosplenomegaly. No hernias.        ULTRASOUND:   Ultrasound performed in the usual fashion, showing only GS measuring 1.6cm consistent with 6w1d. No adnexal pathology     ASSESSMENT and PLAN:    ICD-10-CM ICD-9-CM    1. Absent menses  N91.2 626.0 POCT urine pregnancy      HIV 1/2 Ag/Ab (4th Gen)      Hepatitis B Surface Antigen      Type & Screen - Ob Profile      POCT urine pregnancy      Rubella Antibody, IgG      Urine culture      C. trachomatis/N. gonorrhoeae by AMP DNA      CBC Auto Differential      Hepatitis C Antibody      VARICELLA ZOSTER ANTIBODY, IGG      Treponema Pallidium Antibodies IgG, IgM      2. Screening for cervical cancer  Z12.4 V76.2 Liquid-Based Pap Smear, Screening      HPV High Risk Genotypes, PCR      3. Nausea/vomiting in pregnancy  O21.9 643.90           Orders Placed This Encounter   Procedures    Urine culture    HPV High Risk Genotypes, PCR     Order Specific Question:   Source     Answer:   Cervix     Order Specific Question:   Release to patient     Answer:   Immediate    HIV 1/2 Ag/Ab (4th Gen)     Standing Status:   Future     Number of Occurrences:   1     Standing Expiration Date:   6/14/2025     Order Specific Question:   Release to patient     Answer:   Immediate    Hepatitis B Surface Antigen     Standing Status:   Future     Number of Occurrences:   1     Standing Expiration Date:   6/14/2025    Rubella Antibody, IgG     Standing Status:   Future     Number of Occurrences:   1     Standing Expiration Date:    6/14/2025    C. trachomatis/N. gonorrhoeae by AMP DNA     Order Specific Question:   Source:     Answer:   Cervicovaginal    CBC Auto Differential     Standing Status:   Future     Number of Occurrences:   1     Standing Expiration Date:   6/14/2025    Hepatitis C Antibody     Standing Status:   Future     Number of Occurrences:   1     Standing Expiration Date:   8/13/2025     Order Specific Question:   Release to patient     Answer:   Immediate    VARICELLA ZOSTER ANTIBODY, IGG     Standing Status:   Future     Number of Occurrences:   1     Standing Expiration Date:   9/12/2025    Treponema Pallidium Antibodies IgG, IgM     Standing Status:   Future     Number of Occurrences:   1     Standing Expiration Date:   9/12/2025    POCT urine pregnancy    POCT urine pregnancy    Type & Screen - Ob Profile     Standing Status:   Future     Number of Occurrences:   1     Standing Expiration Date:   7/14/2024           - repeat US in 2 weeks for viability  - 1T labs, pap, GC/CT then  - nausea meds sent in     Petra Vásquez M.D.  Obstetrics and Gynecology     No follow-ups on file.

## 2024-06-17 ENCOUNTER — PATIENT MESSAGE (OUTPATIENT)
Dept: OBSTETRICS AND GYNECOLOGY | Facility: CLINIC | Age: 29
End: 2024-06-17
Payer: COMMERCIAL

## 2024-06-17 DIAGNOSIS — F90.2 ATTENTION DEFICIT HYPERACTIVITY DISORDER (ADHD), COMBINED TYPE: ICD-10-CM

## 2024-06-17 LAB
RUBV IGG SER-ACNC: 18.5 IU/ML
RUBV IGG SER-IMP: REACTIVE
VARICELLA INTERPRETATION: POSITIVE
VARICELLA ZOSTER IGG: 451.3

## 2024-06-17 RX ORDER — DEXTROAMPHETAMINE SACCHARATE, AMPHETAMINE ASPARTATE MONOHYDRATE, DEXTROAMPHETAMINE SULFATE AND AMPHETAMINE SULFATE 7.5; 7.5; 7.5; 7.5 MG/1; MG/1; MG/1; MG/1
30 CAPSULE, EXTENDED RELEASE ORAL EVERY MORNING
Qty: 30 CAPSULE | Refills: 0 | Status: SHIPPED | OUTPATIENT
Start: 2024-06-17 | End: 2024-07-17

## 2024-06-17 NOTE — TELEPHONE ENCOUNTER
No care due was identified.  [facility] Embedded Care Due Messages. Reference number: [id number].   6/17/2024 10:01:02 AM CDT

## 2024-06-17 NOTE — TELEPHONE ENCOUNTER
Refill Routing Note   Medication(s) are not appropriate for processing by [facility] for the following reason(s):        Outside of protocol    [facility] action(s):  Route               Appointments  past 12m or future 3m with PCP    Date Provider   Last Visit   5/9/2024 Brenda Lo MD   Next Visit   Visit date not found Brenda Lo MD   ED visits in past 90 days: 0        Note composed:2:36 PM 06/17/2024

## 2024-06-18 RX ORDER — ONDANSETRON 8 MG/1
8 TABLET, ORALLY DISINTEGRATING ORAL EVERY 12 HOURS PRN
Qty: 30 TABLET | Refills: 1 | Status: SHIPPED | OUTPATIENT
Start: 2024-06-18

## 2024-07-02 ENCOUNTER — PATIENT MESSAGE (OUTPATIENT)
Dept: OBSTETRICS AND GYNECOLOGY | Facility: CLINIC | Age: 29
End: 2024-07-02

## 2024-07-02 ENCOUNTER — ROUTINE PRENATAL (OUTPATIENT)
Dept: OBSTETRICS AND GYNECOLOGY | Facility: CLINIC | Age: 29
End: 2024-07-02
Payer: COMMERCIAL

## 2024-07-02 VITALS
WEIGHT: 173.94 LBS | SYSTOLIC BLOOD PRESSURE: 108 MMHG | DIASTOLIC BLOOD PRESSURE: 68 MMHG | BODY MASS INDEX: 31.81 KG/M2

## 2024-07-02 DIAGNOSIS — O02.1 MISSED ABORTION: Primary | ICD-10-CM

## 2024-07-02 DIAGNOSIS — O02.0 ANEMBRYONIC PREGNANCY: Primary | ICD-10-CM

## 2024-07-02 DIAGNOSIS — O02.0 ANEMBRYONIC PREGNANCY: ICD-10-CM

## 2024-07-02 PROCEDURE — 99999 PR PBB SHADOW E&M-EST. PATIENT-LVL III: CPT | Mod: PBBFAC,,, | Performed by: STUDENT IN AN ORGANIZED HEALTH CARE EDUCATION/TRAINING PROGRAM

## 2024-07-02 RX ORDER — SODIUM CHLORIDE 9 MG/ML
INJECTION, SOLUTION INTRAVENOUS CONTINUOUS
Status: CANCELLED | OUTPATIENT
Start: 2024-07-02

## 2024-07-02 RX ORDER — DOXYCYCLINE HYCLATE 100 MG
200 TABLET ORAL
Status: CANCELLED | OUTPATIENT
Start: 2024-07-02

## 2024-07-02 RX ORDER — FAMOTIDINE 20 MG/1
20 TABLET, FILM COATED ORAL
Status: SHIPPED | OUTPATIENT
Start: 2024-07-02

## 2024-07-02 NOTE — PROGRESS NOTES
Here today for f/u US. No bleeding, still nauseated.     TVUS with chaperone shows GS measuring 31mm, no CRL or YS detected.     A/P:    Makenna was seen today for routine prenatal visit.    Diagnoses and all orders for this visit:    Anembryonic pregnancy  -     Full code; Standing  -     Insert peripheral IV; Standing  -     Notify Physician/Vital Signs Parameters Urine output less than 0.5mL/kg/hr (with indwelling catheter) or 30 mL/hr (without indwelling catheter) or blood glucose greater than 200 mg/dL; Standing  -     Notify physician; Standing  -     Notify Physician - Potential Need of Opioid Reversal; Standing  -     Diet NPO; Standing  -     Chlorohexidine Gluconate Bath; Standing  -     Place in Outpatient; Standing  -     Place sequential compression device; Standing  -     Full code  -     Insert peripheral IV  -     Diet NPO  -     Place sequential compression device    Other orders  -     0.9%  NaCl infusion  -     IP VTE LOW RISK PATIENT; Standing  -     famotidine tablet 20 mg  -     doxycycline tablet 200 mg  -     IP VTE LOW RISK PATIENT        - discussed anembryonic pregnancy   - Today the patient was diagnosed with a miscarriage. We discussed the management options including expectant, medical, and surgical.     With expectant management 80% of women will naturally expulse products within 8 weeks. Bleeding precautions and ED precautions were discussed. After a bleeding episode we recommend a UPT or ultrasound within 2 weeks to confirm complete expulsion. Discussed risks of retained products and possible need for D&C.     We discussed medical management of the miscarriage, the success rate of this is roughly 85%.  You'll place 800 micrograms (4 tablets) vaginally. If you get no response within 3 hours (bleeding/cramping), you can place an additional 800 micrograms in the vagina. I will call in some pain medication for you to take.  If you're experiencing heavy vaginal bleeding for greater than 2  hours (2 pads/hr for 2 hours), feel light headed, dizzy or weak you may need to go to the ED for evaluation. Discussed possible need for D&C. If you've have not passed anything within 7 days, let me know and we need to do a D&C. We'll make an apt in 3 weeks to do a pregnancy test in the office and then discuss next steps.     Surgical management discussed with a success rate of 99%. Discussed D&C procedure in detail.  Discussed need for general anesthesia. Discussed risk of infection and antibiotic therapy prior to procedure.     - opts for surgery management, orders placed. Will schedule. O pos.     Petra Vásquez M.D.  Obstetrics and Gynecology

## 2024-07-02 NOTE — Clinical Note
Can we see if we can schedule her suction D&C @ [facility] [place] or [facility] for noon tomorrow 7/3? Dx is missed AB, anembryonic pregnancy Or I could do before or after clinic. I could do before or 8AM Friday and push back some AM apts. Or if no availability I can do anytime Monday

## 2024-07-03 PROBLEM — Z98.890 S/P D&C (STATUS POST DILATION AND CURETTAGE): Status: ACTIVE | Noted: 2024-07-03

## 2024-07-03 PROBLEM — O02.0 ANEMBRYONIC PREGNANCY: Status: ACTIVE | Noted: 2024-07-03

## 2024-07-16 ENCOUNTER — OFFICE VISIT (OUTPATIENT)
Dept: OBSTETRICS AND GYNECOLOGY | Facility: CLINIC | Age: 29
End: 2024-07-16
Payer: COMMERCIAL

## 2024-07-16 VITALS
SYSTOLIC BLOOD PRESSURE: 122 MMHG | DIASTOLIC BLOOD PRESSURE: 78 MMHG | HEIGHT: 62 IN | BODY MASS INDEX: 32.57 KG/M2 | WEIGHT: 177 LBS

## 2024-07-16 DIAGNOSIS — F41.9 ANXIETY AND DEPRESSION: ICD-10-CM

## 2024-07-16 DIAGNOSIS — F32.A ANXIETY AND DEPRESSION: ICD-10-CM

## 2024-07-16 DIAGNOSIS — F32.A DEPRESSION, UNSPECIFIED DEPRESSION TYPE: ICD-10-CM

## 2024-07-16 DIAGNOSIS — Z98.890 S/P D&C (STATUS POST DILATION AND CURETTAGE): ICD-10-CM

## 2024-07-16 DIAGNOSIS — O02.1 MISSED AB: Primary | ICD-10-CM

## 2024-07-16 LAB
B-HCG UR QL: NEGATIVE
CTP QC/QA: YES

## 2024-07-16 PROCEDURE — 81025 URINE PREGNANCY TEST: CPT | Mod: S$GLB,,, | Performed by: STUDENT IN AN ORGANIZED HEALTH CARE EDUCATION/TRAINING PROGRAM

## 2024-07-16 PROCEDURE — 3074F SYST BP LT 130 MM HG: CPT | Mod: CPTII,S$GLB,, | Performed by: STUDENT IN AN ORGANIZED HEALTH CARE EDUCATION/TRAINING PROGRAM

## 2024-07-16 PROCEDURE — 3078F DIAST BP <80 MM HG: CPT | Mod: CPTII,S$GLB,, | Performed by: STUDENT IN AN ORGANIZED HEALTH CARE EDUCATION/TRAINING PROGRAM

## 2024-07-16 PROCEDURE — 1159F MED LIST DOCD IN RCRD: CPT | Mod: CPTII,S$GLB,, | Performed by: STUDENT IN AN ORGANIZED HEALTH CARE EDUCATION/TRAINING PROGRAM

## 2024-07-16 PROCEDURE — 99024 POSTOP FOLLOW-UP VISIT: CPT | Mod: S$GLB,,, | Performed by: STUDENT IN AN ORGANIZED HEALTH CARE EDUCATION/TRAINING PROGRAM

## 2024-07-16 PROCEDURE — 99999 PR PBB SHADOW E&M-EST. PATIENT-LVL III: CPT | Mod: PBBFAC,,, | Performed by: STUDENT IN AN ORGANIZED HEALTH CARE EDUCATION/TRAINING PROGRAM

## 2024-07-16 PROCEDURE — 3044F HG A1C LEVEL LT 7.0%: CPT | Mod: CPTII,S$GLB,, | Performed by: STUDENT IN AN ORGANIZED HEALTH CARE EDUCATION/TRAINING PROGRAM

## 2024-07-16 NOTE — PROGRESS NOTES
History & Physical  Gynecology      SUBJECTIVE:     Chief Complaint: Missed Ab       History of Present Illness:    Here today s/p suction D&C for anembryonic pregnancy 2 weeks ago. Bleeding scant. Pain gone. Having difficulty coping emotionally. Asks for therapy resources.   UTERINE CONTENTS:     - PRODUCTS OF CONCEPTION._______________________________________________________________________________________________________     SPECIMEN AND SOURCE:   Products of conception (POC)           Review of patient's allergies indicates:   Allergen Reactions    Bactrim [sulfamethoxazole-trimethoprim] Hives    Amoxicillin Nausea And Vomiting       Past Medical History:   Diagnosis Date    ADHD     Annual physical exam 2024    Anxiety     Femoral hernia of left side     GE reflux     Human papilloma virus (HPV) type 9 vaccine administered ,     2 of 3 Received     Insomnia     history of    Ovarian torsion     PONV (postoperative nausea and vomiting)     Unilateral inguinal hernia, without obstruction or gangrene, not specified as recurrent     LEFT    Uses condoms for birth control      Past Surgical History:   Procedure Laterality Date    ADENOIDECTOMY      DILATION AND CURETTAGE OF UTERUS USING SUCTION N/A 7/3/2024    Procedure: DILATION AND CURETTAGE, UTERUS, USING SUCTION;  Surgeon: Petra Vásquez MD;  Location: [facility];  Service: OB/GYN;  Laterality: N/A;    PELVIC LAPAROSCOPY  2016    Right Ovarian Teratoma  ([facility])    VAGINAL DELIVERY  2019    Girl    WISDOM TOOTH EXTRACTION       OB History          2    Para   1    Term   1       0    AB   1    Living   1         SAB   1    IAB   0    Ectopic   0    Multiple   0    Live Births   1           Obstetric Comments   Menarche ~ 10             Family History   Problem Relation Name Age of Onset    Diabetes Mother      Hypertension Mother      Diabetes Father      Hypertension Father      Sleep apnea Father      Lung cancer Maternal  Grandmother      Breast cancer Neg Hx      Colon cancer Neg Hx      Ovarian cancer Neg Hx      Cancer Neg Hx      Uterine cancer Neg Hx       Social History     Tobacco Use    Smoking status: Never    Smokeless tobacco: Never   Substance Use Topics    Alcohol use: Not Currently    Drug use: No       Current Outpatient Medications   Medication Sig    dextroamphetamine-amphetamine (ADDERALL XR) 30 MG 24 hr capsule Take 1 capsule (30 mg total) by mouth every morning. (Patient not taking: Reported on 6/14/2024)    dextroamphetamine-amphetamine (ADDERALL XR) 30 MG 24 hr capsule Take 1 capsule (30 mg total) by mouth every morning.    doxylamine-pyridoxine, vit B6, (DICLEGIS) 10-10 mg TbEC Take 2 tablets by mouth every evening.    fexofenadine HCl (ALLEGRA ALLERGY ORAL) Take 1 tablet by mouth as needed. (Patient not taking: Reported on 6/14/2024)    ibuprofen (ADVIL,MOTRIN) 600 MG tablet Take 1 tablet (600 mg total) by mouth every 6 (six) hours as needed for Pain.    ondansetron (ZOFRAN-ODT) 4 MG TbDL Take 1 tablet (4 mg total) by mouth every 6 (six) hours as needed (nausea).    ondansetron (ZOFRAN-ODT) 8 MG TbDL Take 1 tablet (8 mg total) by mouth every 12 (twelve) hours as needed (nausea).     Current Facility-Administered Medications   Medication    famotidine tablet 20 mg         Review of Systems:  Review of Systems   Constitutional:  Negative for chills, fatigue and fever.   HENT:  Negative for congestion.    Eyes:  Negative for visual disturbance.   Respiratory:  Negative for cough and shortness of breath.    Cardiovascular:  Negative for chest pain and palpitations.   Gastrointestinal:  Negative for abdominal distention, abdominal pain, constipation, diarrhea, nausea and vomiting.   Genitourinary:  Negative for difficulty urinating, dysuria, hematuria, vaginal bleeding and vaginal discharge.   Skin:  Negative for rash.   Neurological:  Negative for dizziness, seizures, light-headedness and headaches.    Hematological:  Does not bruise/bleed easily.   Psychiatric/Behavioral:  Negative for dysphoric mood. The patient is not nervous/anxious.         OBJECTIVE:     Physical Exam:  Physical Exam  Vitals reviewed.   Constitutional:       General: She is not in acute distress.     Appearance: Normal appearance. She is well-developed.   HENT:      Head: Normocephalic and atraumatic.   Cardiovascular:      Rate and Rhythm: Normal rate and regular rhythm.   Pulmonary:      Effort: Pulmonary effort is normal.   Abdominal:      General: There is no distension.      Palpations: Abdomen is soft.   Genitourinary:     Vagina: Normal.   Skin:     General: Skin is warm.   Neurological:      Mental Status: She is alert and oriented to person, place, and time.   Psychiatric:         Behavior: Behavior normal.         Thought Content: Thought content normal.         Judgment: Judgment normal.           ASSESSMENT:       ICD-10-CM ICD-9-CM    1. Missed ab  O02.1 632 POCT urine pregnancy      Ambulatory referral/consult to Primary Care Behavioral Health (Non-Opioids)      2. S/P D&C (status post dilation and curettage)  Z98.890 V45.89       3. Depression, unspecified depression type  F32.A 311 Ambulatory referral/consult to Primary Care Behavioral Health (Non-Opioids)      4. Anxiety and depression  F41.9 300.00 Ambulatory referral/consult to Primary Care Behavioral Health (Non-Opioids)    F32.A 311           Orders Placed This Encounter   Procedures    Ambulatory referral/consult to Primary Care Behavioral Health (Non-Opioids)     Standing Status:   Future     Standing Expiration Date:   7/16/2025     Referral Priority:   Routine     Referral Type:   Psychiatric     Referral Reason:   Specialty Services Required     Requested Specialty:   Behavioral Health     Number of Visits Requested:   1    POCT urine pregnancy           Plan:      - UPT negative  - reviewed pathology  - unsure if wants to TTC again given hyper E discussed options  and remedies  - referral to therapy for loss   - declines medication  - declines contraception    Petra Vásquez M.D.  Obstetrics and Gynecology

## 2024-07-18 ENCOUNTER — PATIENT MESSAGE (OUTPATIENT)
Dept: PSYCHIATRY | Facility: CLINIC | Age: 29
End: 2024-07-18
Payer: COMMERCIAL

## 2024-07-22 ENCOUNTER — PATIENT MESSAGE (OUTPATIENT)
Dept: OBSTETRICS AND GYNECOLOGY | Facility: CLINIC | Age: 29
End: 2024-07-22
Payer: COMMERCIAL

## 2024-07-22 RX ORDER — SERTRALINE HYDROCHLORIDE 50 MG/1
50 TABLET, FILM COATED ORAL DAILY
Qty: 30 TABLET | Refills: 2 | Status: SHIPPED | OUTPATIENT
Start: 2024-07-22 | End: 2025-07-22

## 2024-07-26 DIAGNOSIS — F90.2 ATTENTION DEFICIT HYPERACTIVITY DISORDER (ADHD), COMBINED TYPE: ICD-10-CM

## 2024-07-28 NOTE — TELEPHONE ENCOUNTER
Refill Routing Note   Medication(s) are not appropriate for processing by [facility] for the following reason(s):        Outside of protocol    [facility] action(s):  Route             Appointments  past 12m or future 3m with PCP    Date Provider   Last Visit   5/9/2024 Brenda Lo MD   Next Visit   Visit date not found Brenda Lo MD   ED visits in past 90 days: 0        Note composed:2:49 AM 07/28/2024

## 2024-07-30 RX ORDER — DEXTROAMPHETAMINE SACCHARATE, AMPHETAMINE ASPARTATE MONOHYDRATE, DEXTROAMPHETAMINE SULFATE AND AMPHETAMINE SULFATE 7.5; 7.5; 7.5; 7.5 MG/1; MG/1; MG/1; MG/1
30 CAPSULE, EXTENDED RELEASE ORAL EVERY MORNING
Qty: 30 CAPSULE | Refills: 0 | Status: SHIPPED | OUTPATIENT
Start: 2024-07-30 | End: 2024-08-29

## 2024-08-01 ENCOUNTER — PATIENT MESSAGE (OUTPATIENT)
Dept: PSYCHIATRY | Facility: CLINIC | Age: 29
End: 2024-08-01
Payer: COMMERCIAL

## 2024-08-12 PROBLEM — Z00.00 ANNUAL PHYSICAL EXAM: Status: RESOLVED | Noted: 2024-05-09 | Resolved: 2024-08-12

## 2024-12-16 ENCOUNTER — OFFICE VISIT (OUTPATIENT)
Dept: FAMILY MEDICINE | Facility: CLINIC | Age: 29
End: 2024-12-16
Payer: COMMERCIAL

## 2024-12-16 VITALS
OXYGEN SATURATION: 99 % | BODY MASS INDEX: 30.95 KG/M2 | TEMPERATURE: 98 F | WEIGHT: 168.19 LBS | HEART RATE: 75 BPM | SYSTOLIC BLOOD PRESSURE: 112 MMHG | HEIGHT: 62 IN | DIASTOLIC BLOOD PRESSURE: 68 MMHG

## 2024-12-16 DIAGNOSIS — R50.9 FEVER, UNSPECIFIED FEVER CAUSE: ICD-10-CM

## 2024-12-16 DIAGNOSIS — J02.9 PHARYNGITIS, UNSPECIFIED ETIOLOGY: ICD-10-CM

## 2024-12-16 DIAGNOSIS — J06.9 UPPER RESPIRATORY TRACT INFECTION, UNSPECIFIED TYPE: Primary | ICD-10-CM

## 2024-12-16 LAB
INFLUENZA A, MOLECULAR: NOT DETECTED
INFLUENZA B, MOLECULAR: NOT DETECTED
RSV AG BY MOLECULAR METHOD: NOT DETECTED
SARS-COV-2 RNA RESP QL NAA+PROBE: NOT DETECTED

## 2024-12-16 PROCEDURE — 3008F BODY MASS INDEX DOCD: CPT | Mod: CPTII,S$GLB,, | Performed by: NURSE PRACTITIONER

## 2024-12-16 PROCEDURE — 1159F MED LIST DOCD IN RCRD: CPT | Mod: CPTII,S$GLB,, | Performed by: NURSE PRACTITIONER

## 2024-12-16 PROCEDURE — 99999 PR PBB SHADOW E&M-EST. PATIENT-LVL V: CPT | Mod: PBBFAC,,, | Performed by: NURSE PRACTITIONER

## 2024-12-16 PROCEDURE — 1160F RVW MEDS BY RX/DR IN RCRD: CPT | Mod: CPTII,S$GLB,, | Performed by: NURSE PRACTITIONER

## 2024-12-16 PROCEDURE — 3044F HG A1C LEVEL LT 7.0%: CPT | Mod: CPTII,S$GLB,, | Performed by: NURSE PRACTITIONER

## 2024-12-16 PROCEDURE — 3074F SYST BP LT 130 MM HG: CPT | Mod: CPTII,S$GLB,, | Performed by: NURSE PRACTITIONER

## 2024-12-16 PROCEDURE — 99213 OFFICE O/P EST LOW 20 MIN: CPT | Mod: S$GLB,,, | Performed by: NURSE PRACTITIONER

## 2024-12-16 PROCEDURE — 0241U SARS-COV2 (COVID) WITH FLU/RSV BY PCR: CPT | Performed by: NURSE PRACTITIONER

## 2024-12-16 PROCEDURE — 3078F DIAST BP <80 MM HG: CPT | Mod: CPTII,S$GLB,, | Performed by: NURSE PRACTITIONER

## 2024-12-16 RX ORDER — AZITHROMYCIN 250 MG/1
TABLET, FILM COATED ORAL
Qty: 6 TABLET | Refills: 0 | Status: SHIPPED | OUTPATIENT
Start: 2024-12-16 | End: 2024-12-21

## 2024-12-16 NOTE — PROGRESS NOTES
Subjective     Patient ID: Makenna Mcintosh is a 29 y.o. female.    Chief Complaint: Fever, Sore Throat, Cough, Nausea, and Generalized Body Aches    Cough  This is a new problem. The current episode started in the past 7 days. The problem has been unchanged. The problem occurs every few hours. The cough is Productive of sputum. Associated symptoms include a fever, headaches, nasal congestion, rhinorrhea and a sore throat. Pertinent negatives include no chest pain, chills, ear congestion, ear pain, heartburn, hemoptysis, myalgias, postnasal drip, rash, shortness of breath, sweats, weight loss or wheezing. Nothing aggravates the symptoms. She has tried nothing (Declines medication for cough) for the symptoms. The treatment provided no relief. There is no history of asthma, bronchiectasis, bronchitis, COPD, emphysema, environmental allergies or pneumonia.     Past Medical History:   Diagnosis Date    ADHD     Annual physical exam 05/09/2024    Anxiety     Femoral hernia of left side     GE reflux     Human papilloma virus (HPV) type 9 vaccine administered 2006, 2018    2 of 3 Received     Insomnia     history of    Ovarian torsion     PONV (postoperative nausea and vomiting)     Unilateral inguinal hernia, without obstruction or gangrene, not specified as recurrent     LEFT    Uses condoms for birth control      Social History     Socioeconomic History    Marital status:    Occupational History    Occupation: opthalmology clinic scribe    Tobacco Use    Smoking status: Never    Smokeless tobacco: Never   Substance and Sexual Activity    Alcohol use: Not Currently    Drug use: No    Sexual activity: Yes     Partners: Male     Birth control/protection: None     Comment: :       Social Drivers of Health     Financial Resource Strain: Low Risk  (12/16/2024)    Overall Financial Resource Strain (CARDIA)     Difficulty of Paying Living Expenses: Not hard at all   Food Insecurity: No Food Insecurity  (12/16/2024)    Hunger Vital Sign     Worried About Running Out of Food in the Last Year: Never true     Ran Out of Food in the Last Year: Never true   Transportation Needs: No Transportation Needs (5/6/2024)    PRAPARE - Transportation     Lack of Transportation (Medical): No     Lack of Transportation (Non-Medical): No   Physical Activity: Sufficiently Active (12/16/2024)    Exercise Vital Sign     Days of Exercise per Week: 4 days     Minutes of Exercise per Session: 40 min   Stress: No Stress Concern Present (12/16/2024)    [employer] - Occupational Stress Questionnaire     Feeling of Stress : Not at all   Housing Stability: Unknown (12/16/2024)    Housing Stability Vital Sign     Unable to Pay for Housing in the Last Year: No     Past Surgical History:   Procedure Laterality Date    ADENOIDECTOMY      DILATION AND CURETTAGE OF UTERUS USING SUCTION N/A 7/3/2024    Procedure: DILATION AND CURETTAGE, UTERUS, USING SUCTION;  Surgeon: Petra Vásquez MD;  Location: [facility];  Service: OB/GYN;  Laterality: N/A;    PELVIC LAPAROSCOPY  08/04/2016    Right Ovarian Teratoma  ([facility])    VAGINAL DELIVERY  04/20/2019    Girl    WISDOM TOOTH EXTRACTION         Review of Systems   Constitutional:  Positive for fever. Negative for chills and weight loss.   HENT:  Positive for rhinorrhea and sore throat. Negative for ear pain and postnasal drip.    Eyes: Negative.    Respiratory:  Positive for cough. Negative for hemoptysis, shortness of breath and wheezing.    Cardiovascular: Negative.  Negative for chest pain.   Gastrointestinal: Negative.  Negative for heartburn.   Endocrine: Negative.    Genitourinary: Negative.    Musculoskeletal: Negative.  Negative for myalgias.   Integumentary:  Negative for rash. Negative.   Allergic/Immunologic: Negative.  Negative for environmental allergies.   Neurological:  Positive for headaches.   Psychiatric/Behavioral: Negative.            Objective     Physical  Exam  Vitals and nursing note reviewed.   Constitutional:       Appearance: Normal appearance.   HENT:      Head: Normocephalic.      Right Ear: Tympanic membrane, ear canal and external ear normal.      Left Ear: Tympanic membrane, ear canal and external ear normal.      Nose: Congestion present.      Mouth/Throat:      Mouth: Mucous membranes are moist.      Pharynx: Posterior oropharyngeal erythema present.   Eyes:      Conjunctiva/sclera: Conjunctivae normal.      Pupils: Pupils are equal, round, and reactive to light.   Cardiovascular:      Rate and Rhythm: Normal rate and regular rhythm.      Pulses: Normal pulses.      Heart sounds: Normal heart sounds.   Pulmonary:      Effort: Pulmonary effort is normal.      Breath sounds: Normal breath sounds.   Abdominal:      General: Bowel sounds are normal.      Palpations: Abdomen is soft.   Musculoskeletal:         General: Normal range of motion.      Cervical back: Normal range of motion and neck supple.   Skin:     General: Skin is warm and dry.      Capillary Refill: Capillary refill takes 2 to 3 seconds.   Neurological:      Mental Status: She is alert and oriented to person, place, and time.   Psychiatric:         Mood and Affect: Mood normal.         Behavior: Behavior normal.         Thought Content: Thought content normal.         Judgment: Judgment normal.            Assessment and Plan     1. Upper respiratory tract infection, unspecified type  2. Pharyngitis, unspecified etiology  3. Fever, unspecified fever cause  -     SARS-Cov2 (COVID) with FLU/RSV by PCR  -     azithromycin (Z-GERARDO) 250 MG tablet; Take 2 tablets by mouth on day 1; Take 1 tablet by mouth on days 2-5  Dispense: 6 tablet; Refill: 0  -     (Magic mouthwash) 1:1:1 diphenhydrAMINE(Benadryl) 12.5mg/5ml liq, aluminum & magnesium hydroxide-simethicone (Maalox), LIDOcaine viscous 2%; Swish and spit 5 mLs every 8 (eight) hours as needed. Gargle and spit. DO NOT SWALLOW  Dispense: 360 mL; Refill:  0  Hydrate well  Rest  Tylenol OTC as directed  Zofran as prescribed  Report to ER immediately if symptoms worsen or persist               No follow-ups on file.

## 2024-12-16 NOTE — PATIENT INSTRUCTIONS
Hydrate well  Rest  Tylenol OTC as directed  Zofran as prescribed  Report to ER immediately if symptoms worsen or persist    Cal Mensah,     If you are due for any health screening(s) below please notify me so we can arrange them to be ordered and scheduled. Most healthy patients at your age complete them, but you are free to accept or refuse.     If you can't do it, I'll definitely understand. If you can, I'd certainly appreciate it!    Tests to Keep You Healthy    Cervical Cancer Screening: DUE      Your cervical cancer screening is due     Our records indicate that you may be overdue for your screening Pap smear. A Pap smear is an important health screening that can detect abnormal cells that can become cervical cancer. Cervical cancer screenings allow for early diagnosis and increase the likelihood of successful treatment.     The current recommendation for Pap smear screening is every 3-5 years for women at average risk. We encourage you to schedule your appointment with your womens health provider. Many women see a gynecologist for this screening, but some primary care providers also provide Pap screening.     If you recently had your Pap smear screening performed outside of [facility], please let your health care team know so that they can update your health record.